# Patient Record
Sex: FEMALE | Race: WHITE | HISPANIC OR LATINO | Employment: FULL TIME | URBAN - METROPOLITAN AREA
[De-identification: names, ages, dates, MRNs, and addresses within clinical notes are randomized per-mention and may not be internally consistent; named-entity substitution may affect disease eponyms.]

---

## 2017-12-11 ENCOUNTER — OFFICE VISIT (OUTPATIENT)
Dept: OBSTETRICS AND GYNECOLOGY | Facility: CLINIC | Age: 27
End: 2017-12-11
Payer: COMMERCIAL

## 2017-12-11 VITALS
BODY MASS INDEX: 23.77 KG/M2 | HEIGHT: 61 IN | WEIGHT: 125.88 LBS | SYSTOLIC BLOOD PRESSURE: 118 MMHG | DIASTOLIC BLOOD PRESSURE: 76 MMHG

## 2017-12-11 DIAGNOSIS — Z30.015 ENCOUNTER FOR INITIAL PRESCRIPTION OF VAGINAL RING HORMONAL CONTRACEPTIVE: ICD-10-CM

## 2017-12-11 DIAGNOSIS — Z01.419 WELL WOMAN EXAM WITH ROUTINE GYNECOLOGICAL EXAM: Primary | ICD-10-CM

## 2017-12-11 PROCEDURE — 99999 PR PBB SHADOW E&M-EST. PATIENT-LVL III: CPT | Mod: PBBFAC,,, | Performed by: OBSTETRICS & GYNECOLOGY

## 2017-12-11 PROCEDURE — 99395 PREV VISIT EST AGE 18-39: CPT | Mod: S$GLB,,, | Performed by: OBSTETRICS & GYNECOLOGY

## 2017-12-11 RX ORDER — ETONOGESTREL AND ETHINYL ESTRADIOL VAGINAL RING .015; .12 MG/D; MG/D
1 RING VAGINAL
Qty: 1 EACH | Refills: 12 | Status: SHIPPED | OUTPATIENT
Start: 2017-12-11 | End: 2018-01-12

## 2017-12-11 NOTE — PROGRESS NOTES
Subjective:       Patient ID: Sheree Melgar is a 27 y.o. female.    Chief Complaint:  Annual Exam      History of Present Illness  HPI  Presents for well-woman exam.  No current gyn complaints.  Wants to discuss contraception.  Had weight gain on Depo Provera, and has trouble remembering to take a pill each day.  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  She is mutually monogomous with a male partner.     Last pap: 2016: normal  Had LSIL pap in  with normal colpo and negative ECC in   Pap in  negative  Pap in  negative    GYN & OB History  No LMP recorded. Patient is not currently having periods (Reason: Birth Control).   Date of Last Pap: 2016    OB History    Para Term  AB Living   2 1 1 0 1 1   SAB TAB Ectopic Multiple Live Births   1 0 0 0 1      # Outcome Date GA Lbr Jim/2nd Weight Sex Delivery Anes PTL Lv   2 SAB            1 Term      CS-LTranv   TUTU          Review of Systems  Review of Systems   Constitutional: Negative for activity change, fatigue, fever and unexpected weight change.   Gastrointestinal: Negative for abdominal pain, bloating, constipation, diarrhea, nausea and vomiting.   Endocrine: Negative for hair loss and hot flashes.   Genitourinary: Negative for dyspareunia, dysuria, frequency, genital sores, hematuria, menorrhagia, menstrual problem, pelvic pain, urgency, vaginal bleeding, vaginal discharge, vaginal pain, dysmenorrhea, postcoital bleeding and vaginal odor.   Skin:  Negative for rash and hair changes.   Hematological: Negative for adenopathy.   Breast: Negative for breast mass, breast pain, nipple discharge and skin changes          Objective:    Physical Exam:   Constitutional: She is oriented to person, place, and time. She appears well-developed and well-nourished. No distress.      Neck: Neck supple. No thyromegaly present.     Pulmonary/Chest: Right breast exhibits no inverted nipple, no mass, no  nipple discharge, no skin change, no tenderness, no bleeding and no swelling. Left breast exhibits no inverted nipple, no mass, no nipple discharge, no skin change, no tenderness, no bleeding and no swelling. Breasts are symmetrical.        Abdominal: Soft. She exhibits no distension and no mass. There is no tenderness. There is no rebound and no guarding.     Genitourinary: Pelvic exam was performed with patient supine. There is no rash, tenderness, lesion or injury on the right labia. There is no rash, tenderness, lesion or injury on the left labia. Uterus is not deviated, not enlarged, not fixed, not tender, not hosting fibroids and not experiencing uterine prolapse. Cervix is normal. Right adnexum displays no mass, no tenderness and no fullness. Left adnexum displays no mass, no tenderness and no fullness. No erythema, tenderness, bleeding, rectocele or cystocele in the vagina. No foreign body in the vagina. No signs of injury around the vagina. No vaginal discharge found. Cervix exhibits no motion tenderness, no discharge and no friability.        Uterus Size: 6 cm      Lymphadenopathy:        Right: No inguinal adenopathy present.        Left: No inguinal adenopathy present.    Neurological: She is alert and oriented to person, place, and time.     Psychiatric: She has a normal mood and affect.          Assessment:        1. Well woman exam with routine gynecological exam    2. Encounter for initial prescription of vaginal ring hormonal contraceptive                Plan:      Sheree was seen today for annual exam.    Diagnoses and all orders for this visit:    Well woman exam with routine gynecological exam    Encounter for initial prescription of vaginal ring hormonal contraceptive  -     etonogestrel-ethinyl estradiol (NUVARING) 0.12-0.015 mg/24 hr vaginal ring; Place 1 each vaginally every 21 days. Then remove x 7 days    Discussed contraception options with patient including pills, patch, ring, Depo  Provera, Implanon, Mirena, and Paraguard.  Pt interested in trying the NuvaRing.  Reviewed proper use.  All questions answered.  Reviewed updated recommendations for pap smears (every 3 years) in low risk patients.   Recommend annual pelvic exams.  Reviewed recommendations for annual CBE.  Next pap due in 2019.  RTC 1 year.

## 2018-01-12 ENCOUNTER — OFFICE VISIT (OUTPATIENT)
Dept: OBSTETRICS AND GYNECOLOGY | Facility: CLINIC | Age: 28
End: 2018-01-12
Payer: COMMERCIAL

## 2018-01-12 VITALS
WEIGHT: 126.31 LBS | DIASTOLIC BLOOD PRESSURE: 72 MMHG | BODY MASS INDEX: 23.85 KG/M2 | SYSTOLIC BLOOD PRESSURE: 110 MMHG | HEIGHT: 61 IN

## 2018-01-12 DIAGNOSIS — R11.0 NAUSEA: ICD-10-CM

## 2018-01-12 DIAGNOSIS — O34.219 HISTORY OF CESAREAN DELIVERY, CURRENTLY PREGNANT: ICD-10-CM

## 2018-01-12 DIAGNOSIS — Z32.01 PREGNANCY EXAMINATION OR TEST, POSITIVE RESULT: Primary | ICD-10-CM

## 2018-01-12 LAB
AMORPH CRY UR QL COMP ASSIST: ABNORMAL
BACTERIA #/AREA URNS AUTO: ABNORMAL /HPF
BILIRUB UR QL STRIP: NEGATIVE
CLARITY UR REFRACT.AUTO: ABNORMAL
COLOR UR AUTO: ABNORMAL
GLUCOSE UR QL STRIP: NEGATIVE
HGB UR QL STRIP: ABNORMAL
KETONES UR QL STRIP: NEGATIVE
LEUKOCYTE ESTERASE UR QL STRIP: NEGATIVE
MICROSCOPIC COMMENT: ABNORMAL
NITRITE UR QL STRIP: NEGATIVE
PH UR STRIP: 5 [PH] (ref 5–8)
PROT UR QL STRIP: NEGATIVE
RBC #/AREA URNS AUTO: 9 /HPF (ref 0–4)
SP GR UR STRIP: 1.02 (ref 1–1.03)
SQUAMOUS #/AREA URNS AUTO: 8 /HPF
URN SPEC COLLECT METH UR: ABNORMAL
UROBILINOGEN UR STRIP-ACNC: NEGATIVE EU/DL

## 2018-01-12 PROCEDURE — 87491 CHLMYD TRACH DNA AMP PROBE: CPT

## 2018-01-12 PROCEDURE — 87086 URINE CULTURE/COLONY COUNT: CPT

## 2018-01-12 PROCEDURE — 99999 PR PBB SHADOW E&M-EST. PATIENT-LVL III: CPT | Mod: PBBFAC,,, | Performed by: OBSTETRICS & GYNECOLOGY

## 2018-01-12 PROCEDURE — 99213 OFFICE O/P EST LOW 20 MIN: CPT | Mod: S$GLB,,, | Performed by: OBSTETRICS & GYNECOLOGY

## 2018-01-12 PROCEDURE — 81001 URINALYSIS AUTO W/SCOPE: CPT

## 2018-01-12 RX ORDER — DOXYLAMINE SUCCINATE AND PYRIDOXINE HYDROCHLORIDE, DELAYED RELEASE TABLETS 10 MG/10 MG 10; 10 MG/1; MG/1
2 TABLET, DELAYED RELEASE ORAL NIGHTLY
Qty: 30 TABLET | Refills: 6 | Status: SHIPPED | OUTPATIENT
Start: 2018-01-12 | End: 2018-02-16

## 2018-01-12 NOTE — PROGRESS NOTES
Subjective:       Patient ID: Sheree Melgar is a 27 y.o. female.    Chief Complaint:  Possible Pregnancy      History of Present Illness  HPI  Presents for pregnancy risk assessment.  Current pregnancy was unplanned, but pt is happy.  She has a hx of a term LTCS.  Op note in legacy documents in [facility].  Had primary LTCS for arrest of descent, fetal tachycardia, and maternal fever.  Pt is interested in a .  Also has hx of first trimester SAB.  Has daily nausea.  No vomiting yet.   Last pap: 2016 negative    GYN & OB History  Patient's last menstrual period was 2017.   Date of Last Pap: 2016    OB History    Para Term  AB Living   2 1 1 0 1 1   SAB TAB Ectopic Multiple Live Births   1 0 0 0 1      # Outcome Date GA Lbr Jim/2nd Weight Sex Delivery Anes PTL Lv   2 SAB            1 Term      CS-LTranv   TUTU          Review of Systems  Review of Systems   Constitutional: Negative for activity change, fatigue, fever and unexpected weight change.   Gastrointestinal: Positive for nausea. Negative for abdominal pain, bloating, constipation, diarrhea and vomiting.   Endocrine: Negative for hair loss and hot flashes.   Genitourinary: Negative for dyspareunia, dysuria, frequency, genital sores, hematuria, menorrhagia, menstrual problem (amenorrhea), pelvic pain, urgency, vaginal bleeding, vaginal discharge, vaginal pain, dysmenorrhea, postcoital bleeding and vaginal odor.   Skin:  Negative for rash and hair changes.   Hematological: Negative for adenopathy.   Breast: Positive for breast pain (BL breast soreness).Negative for breast mass, nipple discharge and skin changes          Objective:    Physical Exam:   Constitutional: She is oriented to person, place, and time. She appears well-developed and well-nourished. No distress.             Abdominal: Soft. She exhibits no distension and no mass. There is no tenderness. There is no rebound and no guarding. Hernia confirmed negative in  the right inguinal area and confirmed negative in the left inguinal area.     Genitourinary: Vagina normal. Pelvic exam was performed with patient supine. There is no rash, tenderness, lesion or injury on the right labia. There is no rash, tenderness, lesion or injury on the left labia. Uterus is not deviated, not enlarged, not fixed, not tender and not experiencing uterine prolapse. Right adnexum displays no mass, no tenderness and no fullness. Left adnexum displays no mass, no tenderness and no fullness. No erythema, tenderness, bleeding, rectocele, cystocele or unspecified prolapse of vaginal walls in the vagina. No foreign body in the vagina. No signs of injury around the vagina. No vaginal discharge found. Cervix exhibits no motion tenderness, no discharge and no friability.        Uterus Size: 7 cm       Neurological: She is alert and oriented to person, place, and time.     Psychiatric: She has a normal mood and affect.        UPT: positive  Assessment:        1. Pregnancy examination or test, positive result    2. Nausea    3. History of  delivery, currently pregnant                Plan:      Sheree was seen today for possible pregnancy.    Diagnoses and all orders for this visit:    Pregnancy examination or test, positive result  -     C. trachomatis/N. gonorrhoeae by AMP DNA Cervix  -     CBC auto differential; Future  -     Hepatitis B surface antigen; Future  -     HIV-1 and HIV-2 antibodies; Future  -     RPR; Future  -     Rubella antibody, IgG; Future  -     Type & Screen - Ob Profile; Future  -     Urinalysis  -     Urine culture  -     US OB/GYN Procedure ([facility]); Future    Nausea  -     doxylamine-pyridoxine, vit B6, 10-10 mg TbEC; Take 2 tablets by mouth every evening.    History of  delivery, currently pregnant    The patient was counseled on common complaints of pregnancy.  She was given bleeding and pain precautions.  I oriented the patient to the collaborative  CNM/physician practice, and all questions were answered.    RTC 2 weeks for initial OB visit with CNM, labs, and ultrasound.

## 2018-01-12 NOTE — PATIENT INSTRUCTIONS
Pregnancy    Your exam today shows that you are pregnant.  Pregnancy symptoms  During pregnancy your bodys hormones change. This causes physical and emotional changes. This is normal. Knowing what to expect is important for your piece of mind and so you know when to seek help for a problem. Here are some of the most common symptoms:  · Morning sickness or nausea. This can happen any time of the day or night.  · Tender, swollen breasts  · Need to urinate frequently  · Tiredness or fatigue  · Dizziness  · Indigestion or heartburn  · Food cravings or turn-offs  · Constipation  · Emotional changes. This can range from anxiety to excitement to depression.  General care for a healthy pregnancy  Here are things you can do to help make sure your baby is born healthy:  · Rest when you feel tired. This is especially true in the later months of pregnancy.  · Drink more fluids. Your body needs more fluids than you may be used to. Drink 8 to10 glasses of juice, milk, or water every day.  · Eat well-balanced meals. Eat at regular times to give your body enough protein. You can expect to gain about 30 pounds during the pregnancy. Dont try to diet or lose weight while you are pregnant.  · Take a prenatal vitamin every day. This helps you meet the extra nutritional needs of pregnancy.  · Dont take any other medicine during your pregnancy unless your healthcare provider tells you to. This includes prescription medicines and those you buy over the counter. Many medicines can harm the growing baby.  · If you have nausea or vomiting, dont eat greasy or fried foods. Eat several smaller meals throughout the day rather than 3 large meals.  · If you smoke, you must stop. The nicotine you breathe in goes right to the baby.  · Stay away from alcohol, even in moderate amounts. Daily drinking will harm your baby and can cause permanent brain damage.  · Dont use recreational drugs, especially cocaine, crack, and heroin. These will harm  your baby. Also avoid marijuana.  · If you were using recreational drugs or prescribed medicine when you found out that you were pregnant, talk with your healthcare provider about possible effects on your growing baby.  · If you have medical problems that you need to take medicine for, talk with your healthcare provider.  Follow-up care  Call your healthcare provider to arrange for prenatal care. Prenatal care is important. You can see your family provider, a pregnancy specialist (obstetrician), or a primary care clinic.  When to seek medical advice  Call your healthcare provider right away if any of these occur:  · Vaginal bleeding  · Pain in your belly (abdomen) or back that is moderate or severe  · Lots of vomiting, or you cant keep any fluids down for 6 hours  · Burning feeling when you urinate  · Headache, dizziness, or rapid weight gain  · Fever  · Vision changes or blurred vision  Date Last Reviewed: 10/1/2016  © 7985-7090 [employer]. [address] [place] [address], [place] [address]. All rights reserved. This information is not intended as a substitute for professional medical care. Always follow your healthcare professional's instructions.

## 2018-01-13 LAB — BACTERIA UR CULT: NO GROWTH

## 2018-01-15 LAB
C TRACH DNA SPEC QL NAA+PROBE: NOT DETECTED
N GONORRHOEA DNA SPEC QL NAA+PROBE: NOT DETECTED

## 2018-01-17 ENCOUNTER — TELEPHONE (OUTPATIENT)
Dept: OBSTETRICS AND GYNECOLOGY | Facility: CLINIC | Age: 28
End: 2018-01-17

## 2018-01-17 NOTE — TELEPHONE ENCOUNTER
----- Message from Toya Claros sent at 1/17/2018  1:46 PM CST -----  Contact: patient  States she can't make it Friday am but can come after lunch. Please call patient @ [phone]. Thanks, anival

## 2018-01-19 ENCOUNTER — PROCEDURE VISIT (OUTPATIENT)
Dept: OBSTETRICS AND GYNECOLOGY | Facility: CLINIC | Age: 28
End: 2018-01-19
Payer: COMMERCIAL

## 2018-01-19 ENCOUNTER — LAB VISIT (OUTPATIENT)
Dept: LAB | Facility: HOSPITAL | Age: 28
End: 2018-01-19
Attending: OBSTETRICS & GYNECOLOGY
Payer: COMMERCIAL

## 2018-01-19 ENCOUNTER — INITIAL PRENATAL (OUTPATIENT)
Dept: OBSTETRICS AND GYNECOLOGY | Facility: CLINIC | Age: 28
End: 2018-01-19
Payer: COMMERCIAL

## 2018-01-19 VITALS — WEIGHT: 130.5 LBS | DIASTOLIC BLOOD PRESSURE: 72 MMHG | BODY MASS INDEX: 24.66 KG/M2 | SYSTOLIC BLOOD PRESSURE: 112 MMHG

## 2018-01-19 DIAGNOSIS — Z34.81 SUPERVISION OF NORMAL INTRAUTERINE PREGNANCY IN MULTIGRAVIDA IN FIRST TRIMESTER: Primary | ICD-10-CM

## 2018-01-19 DIAGNOSIS — N91.2 AMENORRHEA, UNSPECIFIED: ICD-10-CM

## 2018-01-19 DIAGNOSIS — Z32.01 PREGNANCY EXAMINATION OR TEST, POSITIVE RESULT: ICD-10-CM

## 2018-01-19 DIAGNOSIS — Z3A.01 7 WEEKS GESTATION OF PREGNANCY: ICD-10-CM

## 2018-01-19 LAB
BASOPHILS # BLD AUTO: 0.02 K/UL
BASOPHILS NFR BLD: 0.3 %
DIFFERENTIAL METHOD: ABNORMAL
EOSINOPHIL # BLD AUTO: 0 K/UL
EOSINOPHIL NFR BLD: 0.4 %
ERYTHROCYTE [DISTWIDTH] IN BLOOD BY AUTOMATED COUNT: 11.6 %
HCT VFR BLD AUTO: 39.3 %
HGB BLD-MCNC: 13.3 G/DL
IMM GRANULOCYTES # BLD AUTO: 0.02 K/UL
IMM GRANULOCYTES NFR BLD AUTO: 0.3 %
LYMPHOCYTES # BLD AUTO: 1.4 K/UL
LYMPHOCYTES NFR BLD: 20.3 %
MCH RBC QN AUTO: 30.8 PG
MCHC RBC AUTO-ENTMCNC: 33.8 G/DL
MCV RBC AUTO: 91 FL
MONOCYTES # BLD AUTO: 0.3 K/UL
MONOCYTES NFR BLD: 4.9 %
NEUTROPHILS # BLD AUTO: 5.1 K/UL
NEUTROPHILS NFR BLD: 73.8 %
NRBC BLD-RTO: 0 /100 WBC
PLATELET # BLD AUTO: 268 K/UL
PMV BLD AUTO: 11.1 FL
RBC # BLD AUTO: 4.32 M/UL
WBC # BLD AUTO: 6.89 K/UL

## 2018-01-19 PROCEDURE — 76801 OB US < 14 WKS SINGLE FETUS: CPT | Mod: S$GLB,,, | Performed by: OBSTETRICS & GYNECOLOGY

## 2018-01-19 PROCEDURE — 86592 SYPHILIS TEST NON-TREP QUAL: CPT

## 2018-01-19 PROCEDURE — 85025 COMPLETE CBC W/AUTO DIFF WBC: CPT

## 2018-01-19 PROCEDURE — 99999 PR PBB SHADOW E&M-EST. PATIENT-LVL II: CPT | Mod: PBBFAC,,, | Performed by: ADVANCED PRACTICE MIDWIFE

## 2018-01-19 PROCEDURE — 86901 BLOOD TYPING SEROLOGIC RH(D): CPT

## 2018-01-19 PROCEDURE — 87340 HEPATITIS B SURFACE AG IA: CPT

## 2018-01-19 PROCEDURE — 86762 RUBELLA ANTIBODY: CPT

## 2018-01-19 PROCEDURE — 0500F INITIAL PRENATAL CARE VISIT: CPT | Mod: S$GLB,,, | Performed by: ADVANCED PRACTICE MIDWIFE

## 2018-01-19 PROCEDURE — 86703 HIV-1/HIV-2 1 RESULT ANTBDY: CPT

## 2018-01-19 PROCEDURE — 36415 COLL VENOUS BLD VENIPUNCTURE: CPT

## 2018-01-19 NOTE — PROGRESS NOTES
Pt here for new ob visit  Oriented to the practice including CNM/MD collaboration  Reviewed cultures. Will do NOB labs today.   Introduced to Coffective alhaji  Blue bag materials reviewed  Warning signs discussed.  US reviewed. Single intrauterine gestation measuring 7 weeks.

## 2018-01-20 LAB
ABO + RH BLD: NORMAL
BLD GP AB SCN CELLS X3 SERPL QL: NORMAL
RPR SER QL: NORMAL

## 2018-01-22 LAB
HBV SURFACE AG SERPL QL IA: NEGATIVE
HIV 1+2 AB+HIV1 P24 AG SERPL QL IA: NEGATIVE
RUBV IGG SER-ACNC: 7.7 IU/ML
RUBV IGG SER-IMP: ABNORMAL

## 2018-02-16 ENCOUNTER — ROUTINE PRENATAL (OUTPATIENT)
Dept: OBSTETRICS AND GYNECOLOGY | Facility: CLINIC | Age: 28
End: 2018-02-16
Payer: COMMERCIAL

## 2018-02-16 VITALS
SYSTOLIC BLOOD PRESSURE: 118 MMHG | BODY MASS INDEX: 24.24 KG/M2 | DIASTOLIC BLOOD PRESSURE: 74 MMHG | WEIGHT: 128.31 LBS

## 2018-02-16 DIAGNOSIS — Z3A.11 11 WEEKS GESTATION OF PREGNANCY: ICD-10-CM

## 2018-02-16 DIAGNOSIS — O34.219 HISTORY OF CESAREAN DELIVERY, CURRENTLY PREGNANT: Primary | ICD-10-CM

## 2018-02-16 DIAGNOSIS — O21.9 NAUSEA AND VOMITING IN PREGNANCY PRIOR TO 22 WEEKS GESTATION: ICD-10-CM

## 2018-02-16 PROCEDURE — 99999 PR PBB SHADOW E&M-EST. PATIENT-LVL II: CPT | Mod: PBBFAC,,, | Performed by: MIDWIFE

## 2018-02-16 PROCEDURE — 0502F SUBSEQUENT PRENATAL CARE: CPT | Mod: S$GLB,,, | Performed by: MIDWIFE

## 2018-02-16 RX ORDER — ONDANSETRON HYDROCHLORIDE 8 MG/1
8 TABLET, FILM COATED ORAL EVERY 12 HOURS PRN
Qty: 20 TABLET | Refills: 0 | Status: SHIPPED | OUTPATIENT
Start: 2018-02-16 | End: 2018-06-28

## 2018-02-16 RX ORDER — PROMETHAZINE HYDROCHLORIDE 25 MG/1
25 TABLET ORAL EVERY 6 HOURS PRN
Qty: 30 TABLET | Refills: 1 | Status: SHIPPED | OUTPATIENT
Start: 2018-02-16 | End: 2018-06-28

## 2018-03-27 ENCOUNTER — ROUTINE PRENATAL (OUTPATIENT)
Dept: OBSTETRICS AND GYNECOLOGY | Facility: CLINIC | Age: 28
End: 2018-03-27
Payer: COMMERCIAL

## 2018-03-27 VITALS
SYSTOLIC BLOOD PRESSURE: 100 MMHG | DIASTOLIC BLOOD PRESSURE: 58 MMHG | WEIGHT: 131.81 LBS | BODY MASS INDEX: 24.91 KG/M2

## 2018-03-27 DIAGNOSIS — Z36.89 ENCOUNTER FOR FETAL ANATOMIC SURVEY: ICD-10-CM

## 2018-03-27 DIAGNOSIS — O34.219 HISTORY OF CESAREAN DELIVERY, CURRENTLY PREGNANT: Primary | ICD-10-CM

## 2018-03-27 PROCEDURE — 99999 PR PBB SHADOW E&M-EST. PATIENT-LVL II: CPT | Mod: PBBFAC,,, | Performed by: ADVANCED PRACTICE MIDWIFE

## 2018-03-27 PROCEDURE — 0502F SUBSEQUENT PRENATAL CARE: CPT | Mod: S$GLB,,, | Performed by: ADVANCED PRACTICE MIDWIFE

## 2018-03-27 NOTE — PROGRESS NOTES
Doing well, still having N/V but able to eat frequent small meals and drink water.  Declines genetic testing  Anatomy screening at [facility]  Encouraged hydration  Coffective counseling sheet Fall In Love discussed with mother. Reinforced immediate skin to skin, the magic first hour, importance of the first feeding and delaying routine procedures. Encouraged mother to download Coffective mobile alhaji if she has not already done so. Mother verbalizes understanding.    STEPHANIE Tovar

## 2018-04-16 ENCOUNTER — PROCEDURE VISIT (OUTPATIENT)
Dept: OBSTETRICS AND GYNECOLOGY | Facility: CLINIC | Age: 28
End: 2018-04-16
Payer: COMMERCIAL

## 2018-04-16 ENCOUNTER — ROUTINE PRENATAL (OUTPATIENT)
Dept: OBSTETRICS AND GYNECOLOGY | Facility: CLINIC | Age: 28
End: 2018-04-16
Payer: COMMERCIAL

## 2018-04-16 VITALS
DIASTOLIC BLOOD PRESSURE: 60 MMHG | WEIGHT: 136.25 LBS | SYSTOLIC BLOOD PRESSURE: 102 MMHG | BODY MASS INDEX: 25.74 KG/M2

## 2018-04-16 DIAGNOSIS — Z36.89 ENCOUNTER FOR FETAL ANATOMIC SURVEY: ICD-10-CM

## 2018-04-16 DIAGNOSIS — O34.219 HISTORY OF CESAREAN DELIVERY, CURRENTLY PREGNANT: Primary | ICD-10-CM

## 2018-04-16 PROCEDURE — 0502F SUBSEQUENT PRENATAL CARE: CPT | Mod: S$GLB,,, | Performed by: ADVANCED PRACTICE MIDWIFE

## 2018-04-16 PROCEDURE — 76805 OB US >/= 14 WKS SNGL FETUS: CPT | Mod: S$GLB,,, | Performed by: OBSTETRICS & GYNECOLOGY

## 2018-04-16 PROCEDURE — 99999 PR PBB SHADOW E&M-EST. PATIENT-LVL II: CPT | Mod: PBBFAC,,, | Performed by: ADVANCED PRACTICE MIDWIFE

## 2018-04-16 NOTE — PROGRESS NOTES
Doing well, no complaints  Anatomy US today, breech presentation, posterior placenta, DANIELLA wnl, EFW 0lb 11oz, anatomy complete, reviewed with pt, Gender reveal on 5/5/18  Encouraged hydration  Desires NCB, speaking with  (padma Grover)  Coffective counseling sheet Fall In Love discussed with mother. Reinforced immediate skin to skin, the magic first hour, importance of the first feeding and delaying routine procedures. Encouraged mother to download Coffective mobile alhaji if she has not already done so. Mother verbalizes understanding.    STEPHANIE Tovar

## 2018-05-21 ENCOUNTER — ROUTINE PRENATAL (OUTPATIENT)
Dept: OBSTETRICS AND GYNECOLOGY | Facility: CLINIC | Age: 28
End: 2018-05-21
Payer: COMMERCIAL

## 2018-05-21 VITALS
DIASTOLIC BLOOD PRESSURE: 60 MMHG | WEIGHT: 143.75 LBS | BODY MASS INDEX: 27.16 KG/M2 | SYSTOLIC BLOOD PRESSURE: 110 MMHG

## 2018-05-21 DIAGNOSIS — O34.219 HISTORY OF CESAREAN DELIVERY, CURRENTLY PREGNANT: Primary | ICD-10-CM

## 2018-05-21 PROCEDURE — 0502F SUBSEQUENT PRENATAL CARE: CPT | Mod: S$GLB,,, | Performed by: ADVANCED PRACTICE MIDWIFE

## 2018-05-21 PROCEDURE — 99999 PR PBB SHADOW E&M-EST. PATIENT-LVL II: CPT | Mod: PBBFAC,,, | Performed by: ADVANCED PRACTICE MIDWIFE

## 2018-05-21 NOTE — PROGRESS NOTES
Doing well   PTL precautions reviewed, when to go to hospital  Stressed hydration and using bug spray   28wk labs nv, RH +  Discussed tdap, will offer nv   Coffective counseling sheet Nourish discussed with mother. Reinforced basic breastfeeding position and latch as well as proper hand expression technique. Encouraged mother to download Coffective mobile alhaji if she has not already done so.  Mother verbalizes understanding.

## 2018-06-18 ENCOUNTER — TELEPHONE (OUTPATIENT)
Dept: OBSTETRICS AND GYNECOLOGY | Facility: CLINIC | Age: 28
End: 2018-06-18

## 2018-06-18 NOTE — TELEPHONE ENCOUNTER
Pt has already rescheduled her appts for next Thursday, 6/28/18 at 9:30am at [facility] location.  Pt voiced understanding.  VB, LPN

## 2018-06-18 NOTE — TELEPHONE ENCOUNTER
----- Message from Kunal Talbot sent at 6/18/2018 10:55 AM CDT -----  Contact: julia [phone]  Patient wants to know if she can change her appt from tomorrow until Thursday at the same time.

## 2018-06-28 ENCOUNTER — LAB VISIT (OUTPATIENT)
Dept: LAB | Facility: HOSPITAL | Age: 28
End: 2018-06-28
Payer: COMMERCIAL

## 2018-06-28 ENCOUNTER — ROUTINE PRENATAL (OUTPATIENT)
Dept: OBSTETRICS AND GYNECOLOGY | Facility: CLINIC | Age: 28
End: 2018-06-28
Payer: COMMERCIAL

## 2018-06-28 VITALS
WEIGHT: 151.69 LBS | BODY MASS INDEX: 28.66 KG/M2 | DIASTOLIC BLOOD PRESSURE: 74 MMHG | SYSTOLIC BLOOD PRESSURE: 118 MMHG

## 2018-06-28 DIAGNOSIS — O34.219 HISTORY OF CESAREAN DELIVERY, CURRENTLY PREGNANT: ICD-10-CM

## 2018-06-28 DIAGNOSIS — O34.219 HISTORY OF CESAREAN DELIVERY, CURRENTLY PREGNANT: Primary | ICD-10-CM

## 2018-06-28 LAB
BASOPHILS # BLD AUTO: 0.01 K/UL
BASOPHILS NFR BLD: 0.2 %
DIFFERENTIAL METHOD: ABNORMAL
EOSINOPHIL # BLD AUTO: 0 K/UL
EOSINOPHIL NFR BLD: 0.5 %
ERYTHROCYTE [DISTWIDTH] IN BLOOD BY AUTOMATED COUNT: 12.7 %
GLUCOSE SERPL-MCNC: 132 MG/DL
HCT VFR BLD AUTO: 32.1 %
HGB BLD-MCNC: 10.2 G/DL
IMM GRANULOCYTES # BLD AUTO: 0.03 K/UL
IMM GRANULOCYTES NFR BLD AUTO: 0.5 %
LYMPHOCYTES # BLD AUTO: 0.6 K/UL
LYMPHOCYTES NFR BLD: 10.9 %
MCH RBC QN AUTO: 30.1 PG
MCHC RBC AUTO-ENTMCNC: 31.8 G/DL
MCV RBC AUTO: 95 FL
MONOCYTES # BLD AUTO: 0.4 K/UL
MONOCYTES NFR BLD: 7.3 %
NEUTROPHILS # BLD AUTO: 4.7 K/UL
NEUTROPHILS NFR BLD: 80.6 %
NRBC BLD-RTO: 0 /100 WBC
PLATELET # BLD AUTO: 233 K/UL
PMV BLD AUTO: 11.2 FL
RBC # BLD AUTO: 3.39 M/UL
WBC # BLD AUTO: 5.88 K/UL

## 2018-06-28 PROCEDURE — 86703 HIV-1/HIV-2 1 RESULT ANTBDY: CPT

## 2018-06-28 PROCEDURE — 82950 GLUCOSE TEST: CPT

## 2018-06-28 PROCEDURE — 99999 PR PBB SHADOW E&M-EST. PATIENT-LVL II: CPT | Mod: PBBFAC,,, | Performed by: ADVANCED PRACTICE MIDWIFE

## 2018-06-28 PROCEDURE — 86592 SYPHILIS TEST NON-TREP QUAL: CPT

## 2018-06-28 PROCEDURE — 36415 COLL VENOUS BLD VENIPUNCTURE: CPT

## 2018-06-28 PROCEDURE — 0502F SUBSEQUENT PRENATAL CARE: CPT | Mod: S$GLB,,, | Performed by: ADVANCED PRACTICE MIDWIFE

## 2018-06-28 PROCEDURE — 85025 COMPLETE CBC W/AUTO DIFF WBC: CPT

## 2018-06-29 LAB
HIV 1+2 AB+HIV1 P24 AG SERPL QL IA: NEGATIVE
RPR SER QL: NORMAL

## 2018-06-29 NOTE — PROGRESS NOTES
Doing well   PTL precautions reviewed, when to go to hospital   Stressed hydration   Doing labs today, RH +  Coffective counseling sheet Protect Breastfeeding discussed with mother. Reinforced avoidence of artifical nipples and formula, unless medically indicated.  Encouraged mother to download Coffective mobile lahaji if she has not already done so. Mother verbalizes understanding.

## 2018-07-11 ENCOUNTER — ROUTINE PRENATAL (OUTPATIENT)
Dept: OBSTETRICS AND GYNECOLOGY | Facility: CLINIC | Age: 28
End: 2018-07-11
Payer: COMMERCIAL

## 2018-07-11 VITALS
DIASTOLIC BLOOD PRESSURE: 60 MMHG | WEIGHT: 156.94 LBS | BODY MASS INDEX: 29.66 KG/M2 | SYSTOLIC BLOOD PRESSURE: 112 MMHG

## 2018-07-11 DIAGNOSIS — O34.219 HISTORY OF CESAREAN DELIVERY, CURRENTLY PREGNANT: Primary | ICD-10-CM

## 2018-07-11 PROCEDURE — 99999 PR PBB SHADOW E&M-EST. PATIENT-LVL II: CPT | Mod: PBBFAC,,, | Performed by: ADVANCED PRACTICE MIDWIFE

## 2018-07-11 PROCEDURE — 0502F SUBSEQUENT PRENATAL CARE: CPT | Mod: S$GLB,,, | Performed by: ADVANCED PRACTICE MIDWIFE

## 2018-07-11 NOTE — PROGRESS NOTES
"Doing well, some vaginal pressure/pain, comes and goes   PTL precautions reviewed, when to go to hospital   28wk labs reviewed   "Quiet Hours"  "

## 2018-07-31 ENCOUNTER — ROUTINE PRENATAL (OUTPATIENT)
Dept: OBSTETRICS AND GYNECOLOGY | Facility: CLINIC | Age: 28
End: 2018-07-31
Payer: COMMERCIAL

## 2018-07-31 VITALS
WEIGHT: 158.06 LBS | DIASTOLIC BLOOD PRESSURE: 68 MMHG | SYSTOLIC BLOOD PRESSURE: 110 MMHG | BODY MASS INDEX: 29.87 KG/M2

## 2018-07-31 DIAGNOSIS — O34.219 HISTORY OF CESAREAN DELIVERY, CURRENTLY PREGNANT: Primary | ICD-10-CM

## 2018-07-31 PROCEDURE — 99999 PR PBB SHADOW E&M-EST. PATIENT-LVL II: CPT | Mod: PBBFAC,,, | Performed by: ADVANCED PRACTICE MIDWIFE

## 2018-07-31 PROCEDURE — 0502F SUBSEQUENT PRENATAL CARE: CPT | Mod: S$GLB,,, | Performed by: ADVANCED PRACTICE MIDWIFE

## 2018-07-31 NOTE — PROGRESS NOTES
Doing well  GBS nv  PTL precautions, FKCs and when to go to hospital  Discussed NCB and birth plan    STEPHANIE Tovar

## 2018-08-09 ENCOUNTER — PATIENT MESSAGE (OUTPATIENT)
Dept: OBSTETRICS AND GYNECOLOGY | Facility: CLINIC | Age: 28
End: 2018-08-09

## 2018-08-14 ENCOUNTER — ROUTINE PRENATAL (OUTPATIENT)
Dept: OBSTETRICS AND GYNECOLOGY | Facility: CLINIC | Age: 28
End: 2018-08-14
Payer: COMMERCIAL

## 2018-08-14 VITALS
DIASTOLIC BLOOD PRESSURE: 74 MMHG | BODY MASS INDEX: 30.49 KG/M2 | WEIGHT: 161.38 LBS | SYSTOLIC BLOOD PRESSURE: 116 MMHG

## 2018-08-14 DIAGNOSIS — O34.219 HISTORY OF CESAREAN DELIVERY, CURRENTLY PREGNANT: Primary | ICD-10-CM

## 2018-08-14 DIAGNOSIS — Z3A.36 36 WEEKS GESTATION OF PREGNANCY: ICD-10-CM

## 2018-08-14 PROCEDURE — 0502F SUBSEQUENT PRENATAL CARE: CPT | Mod: S$GLB,,, | Performed by: MIDWIFE

## 2018-08-14 PROCEDURE — 87081 CULTURE SCREEN ONLY: CPT

## 2018-08-14 PROCEDURE — 99999 PR PBB SHADOW E&M-EST. PATIENT-LVL II: CPT | Mod: PBBFAC,,, | Performed by: MIDWIFE

## 2018-08-17 LAB — BACTERIA SPEC AEROBE CULT: NORMAL

## 2018-08-21 ENCOUNTER — ROUTINE PRENATAL (OUTPATIENT)
Dept: OBSTETRICS AND GYNECOLOGY | Facility: CLINIC | Age: 28
End: 2018-08-21
Payer: COMMERCIAL

## 2018-08-21 VITALS
DIASTOLIC BLOOD PRESSURE: 70 MMHG | WEIGHT: 164.69 LBS | BODY MASS INDEX: 31.12 KG/M2 | SYSTOLIC BLOOD PRESSURE: 110 MMHG

## 2018-08-21 DIAGNOSIS — O34.219 HISTORY OF CESAREAN DELIVERY, CURRENTLY PREGNANT: Primary | ICD-10-CM

## 2018-08-21 DIAGNOSIS — O26.843 SIGNIFICANT DISCREPANCY BETWEEN UTERINE SIZE AND CLINICAL DATES, ANTEPARTUM, THIRD TRIMESTER: ICD-10-CM

## 2018-08-21 PROCEDURE — 99999 PR PBB SHADOW E&M-EST. PATIENT-LVL II: CPT | Mod: PBBFAC,,, | Performed by: ADVANCED PRACTICE MIDWIFE

## 2018-08-21 PROCEDURE — 0502F SUBSEQUENT PRENATAL CARE: CPT | Mod: S$GLB,,, | Performed by: ADVANCED PRACTICE MIDWIFE

## 2018-08-21 NOTE — PROGRESS NOTES
Doing well   Leopold's reveals moderate amount of fluid, will perform ultrasound at nv to assess   VE per pt request  Labor precautions and fetal movement reviewed, when to go to hospital   The skin of the suprapubic region was evaluated and appears clean, dry and intact.  Counseled the patient to shower daily and to wash this area with an antibacterial soap such as Dial daily.  Advised her to not shave the hair from this area from now until after delivery.  I also counseled the patient to place antibacterial hand soap in all her bathrooms and kitchen at home to help facilitate proper hand hygiene practices before and after delivery.

## 2018-08-24 ENCOUNTER — PATIENT MESSAGE (OUTPATIENT)
Dept: OBSTETRICS AND GYNECOLOGY | Facility: CLINIC | Age: 28
End: 2018-08-24

## 2018-08-24 ENCOUNTER — TELEPHONE (OUTPATIENT)
Dept: OBSTETRICS AND GYNECOLOGY | Facility: CLINIC | Age: 28
End: 2018-08-24

## 2018-08-24 NOTE — TELEPHONE ENCOUNTER
FMLA completed, scanned to chart and faxed to Haley at [phone].  Patient notified and original at  at [place].

## 2018-08-29 ENCOUNTER — ROUTINE PRENATAL (OUTPATIENT)
Dept: OBSTETRICS AND GYNECOLOGY | Facility: CLINIC | Age: 28
End: 2018-08-29
Payer: COMMERCIAL

## 2018-08-29 ENCOUNTER — PROCEDURE VISIT (OUTPATIENT)
Dept: OBSTETRICS AND GYNECOLOGY | Facility: CLINIC | Age: 28
End: 2018-08-29
Payer: COMMERCIAL

## 2018-08-29 VITALS — BODY MASS INDEX: 31.2 KG/M2 | DIASTOLIC BLOOD PRESSURE: 60 MMHG | SYSTOLIC BLOOD PRESSURE: 118 MMHG | WEIGHT: 165.13 LBS

## 2018-08-29 DIAGNOSIS — O34.219 HISTORY OF CESAREAN DELIVERY, CURRENTLY PREGNANT: Primary | ICD-10-CM

## 2018-08-29 DIAGNOSIS — O26.843 SIGNIFICANT DISCREPANCY BETWEEN UTERINE SIZE AND CLINICAL DATES, ANTEPARTUM, THIRD TRIMESTER: ICD-10-CM

## 2018-08-29 DIAGNOSIS — O40.3XX0 POLYHYDRAMNIOS AFFECTING PREGNANCY IN THIRD TRIMESTER: ICD-10-CM

## 2018-08-29 PROCEDURE — 0502F SUBSEQUENT PRENATAL CARE: CPT | Mod: S$GLB,,, | Performed by: ADVANCED PRACTICE MIDWIFE

## 2018-08-29 PROCEDURE — 76816 OB US FOLLOW-UP PER FETUS: CPT | Mod: S$GLB,,, | Performed by: OBSTETRICS & GYNECOLOGY

## 2018-08-29 PROCEDURE — 76819 FETAL BIOPHYS PROFIL W/O NST: CPT | Mod: S$GLB,,, | Performed by: OBSTETRICS & GYNECOLOGY

## 2018-08-29 PROCEDURE — 99999 PR PBB SHADOW E&M-EST. PATIENT-LVL II: CPT | Mod: PBBFAC,,, | Performed by: ADVANCED PRACTICE MIDWIFE

## 2018-08-29 NOTE — PROGRESS NOTES
Doing well, ready for baby  US today for size>dates, growth, and BPP, posterior placenta, cephalic presentation, DANIELLA 26.8cm, MVP 8.2cm, EFW 8lb 1oz 72%, reviewed with pt and .  VE per pt request  Hydration encouraged  Labor precautions discussed and when to go to hospital   Will discuss IOL at nv    STEPHANIE Tovar

## 2018-09-04 ENCOUNTER — PATIENT MESSAGE (OUTPATIENT)
Dept: OBSTETRICS AND GYNECOLOGY | Facility: CLINIC | Age: 28
End: 2018-09-04

## 2018-09-05 ENCOUNTER — PATIENT MESSAGE (OUTPATIENT)
Dept: OBSTETRICS AND GYNECOLOGY | Facility: CLINIC | Age: 28
End: 2018-09-05

## 2018-09-07 ENCOUNTER — ROUTINE PRENATAL (OUTPATIENT)
Dept: OBSTETRICS AND GYNECOLOGY | Facility: CLINIC | Age: 28
End: 2018-09-07
Payer: COMMERCIAL

## 2018-09-07 ENCOUNTER — PROCEDURE VISIT (OUTPATIENT)
Dept: OBSTETRICS AND GYNECOLOGY | Facility: CLINIC | Age: 28
End: 2018-09-07
Payer: COMMERCIAL

## 2018-09-07 VITALS
DIASTOLIC BLOOD PRESSURE: 70 MMHG | WEIGHT: 167.13 LBS | BODY MASS INDEX: 31.57 KG/M2 | SYSTOLIC BLOOD PRESSURE: 128 MMHG

## 2018-09-07 DIAGNOSIS — O34.219 HISTORY OF CESAREAN DELIVERY, CURRENTLY PREGNANT: ICD-10-CM

## 2018-09-07 DIAGNOSIS — O48.0 POST-TERM PREGNANCY, 40-42 WEEKS OF GESTATION: ICD-10-CM

## 2018-09-07 DIAGNOSIS — O40.3XX0 POLYHYDRAMNIOS AFFECTING PREGNANCY IN THIRD TRIMESTER: ICD-10-CM

## 2018-09-07 DIAGNOSIS — O48.0 POST-TERM PREGNANCY, 40-42 WEEKS OF GESTATION: Primary | ICD-10-CM

## 2018-09-07 PROCEDURE — 99999 PR PBB SHADOW E&M-EST. PATIENT-LVL II: CPT | Mod: PBBFAC,,, | Performed by: ADVANCED PRACTICE MIDWIFE

## 2018-09-07 PROCEDURE — 76819 FETAL BIOPHYS PROFIL W/O NST: CPT | Mod: S$GLB,,, | Performed by: OBSTETRICS & GYNECOLOGY

## 2018-09-07 PROCEDURE — 0502F SUBSEQUENT PRENATAL CARE: CPT | Mod: S$GLB,,, | Performed by: ADVANCED PRACTICE MIDWIFE

## 2018-09-07 NOTE — PROGRESS NOTES
Reports good FM.  BPP 8 of 8 with DANIELLA 18.4, VTX.    FKC's and S/S labor reviewed.  Attempted SVE unsuccessful patient could not tolerate.  Patient declines IOL states wants to wait another week.  Risks and benefits of  and continued pregnancy discussed.  Advised to LND for < 10 mvts in a 2 he period.  BPP with RTC 1 week if undelivered.

## 2018-09-14 ENCOUNTER — PROCEDURE VISIT (OUTPATIENT)
Dept: OBSTETRICS AND GYNECOLOGY | Facility: CLINIC | Age: 28
End: 2018-09-14
Payer: COMMERCIAL

## 2018-09-14 ENCOUNTER — PATIENT MESSAGE (OUTPATIENT)
Dept: OBSTETRICS AND GYNECOLOGY | Facility: CLINIC | Age: 28
End: 2018-09-14

## 2018-09-14 ENCOUNTER — ROUTINE PRENATAL (OUTPATIENT)
Dept: OBSTETRICS AND GYNECOLOGY | Facility: CLINIC | Age: 28
End: 2018-09-14
Payer: COMMERCIAL

## 2018-09-14 VITALS — DIASTOLIC BLOOD PRESSURE: 68 MMHG | WEIGHT: 168 LBS | BODY MASS INDEX: 31.74 KG/M2 | SYSTOLIC BLOOD PRESSURE: 112 MMHG

## 2018-09-14 DIAGNOSIS — O40.3XX0 POLYHYDRAMNIOS AFFECTING PREGNANCY IN THIRD TRIMESTER: Primary | ICD-10-CM

## 2018-09-14 DIAGNOSIS — O48.0 POST TERM PREGNANCY OVER 40 WEEKS: ICD-10-CM

## 2018-09-14 DIAGNOSIS — Z36.4 SCREENING, ANTENATAL FOR FETAL GROWTH RETARDATION WITH ULTRASOUND: ICD-10-CM

## 2018-09-14 DIAGNOSIS — O48.0 POST-TERM PREGNANCY, 40-42 WEEKS OF GESTATION: ICD-10-CM

## 2018-09-14 PROCEDURE — 99999 PR PBB SHADOW E&M-EST. PATIENT-LVL III: CPT | Mod: PBBFAC,,, | Performed by: ADVANCED PRACTICE MIDWIFE

## 2018-09-14 PROCEDURE — 0502F SUBSEQUENT PRENATAL CARE: CPT | Mod: S$GLB,,, | Performed by: ADVANCED PRACTICE MIDWIFE

## 2018-09-14 PROCEDURE — 76819 FETAL BIOPHYS PROFIL W/O NST: CPT | Mod: S$GLB,,, | Performed by: OBSTETRICS & GYNECOLOGY

## 2018-09-17 ENCOUNTER — HOSPITAL ENCOUNTER (INPATIENT)
Facility: HOSPITAL | Age: 28
LOS: 2 days | Discharge: HOME | End: 2018-09-19
Attending: OBSTETRICS & GYNECOLOGY | Admitting: OBSTETRICS & GYNECOLOGY
Payer: COMMERCIAL

## 2018-09-17 ENCOUNTER — ANESTHESIA (OUTPATIENT)
Dept: OBSTETRICS AND GYNECOLOGY | Facility: HOSPITAL | Age: 28
End: 2018-09-17
Payer: COMMERCIAL

## 2018-09-17 ENCOUNTER — ANESTHESIA EVENT (OUTPATIENT)
Dept: OBSTETRICS AND GYNECOLOGY | Facility: HOSPITAL | Age: 28
End: 2018-09-17
Payer: COMMERCIAL

## 2018-09-17 DIAGNOSIS — O42.90 AMNIOTIC FLUID LEAKING: ICD-10-CM

## 2018-09-17 DIAGNOSIS — Z98.891 S/P CESAREAN SECTION: ICD-10-CM

## 2018-09-17 LAB
ABO + RH BLD: NORMAL
BASOPHILS # BLD AUTO: 0.01 K/UL
BASOPHILS NFR BLD: 0.1 %
BLD GP AB SCN CELLS X3 SERPL QL: NORMAL
DIFFERENTIAL METHOD: ABNORMAL
EOSINOPHIL # BLD AUTO: 0.1 K/UL
EOSINOPHIL NFR BLD: 0.7 %
ERYTHROCYTE [DISTWIDTH] IN BLOOD BY AUTOMATED COUNT: 14.8 %
HCT VFR BLD AUTO: 31.4 %
HGB BLD-MCNC: 9.9 G/DL
LYMPHOCYTES # BLD AUTO: 1.6 K/UL
LYMPHOCYTES NFR BLD: 19 %
MCH RBC QN AUTO: 26.3 PG
MCHC RBC AUTO-ENTMCNC: 31.5 G/DL
MCV RBC AUTO: 83 FL
MONOCYTES # BLD AUTO: 0.5 K/UL
MONOCYTES NFR BLD: 6.1 %
NEUTROPHILS # BLD AUTO: 6.3 K/UL
NEUTROPHILS NFR BLD: 74.1 %
PLATELET # BLD AUTO: 247 K/UL
PMV BLD AUTO: 10.6 FL
RBC # BLD AUTO: 3.77 M/UL
WBC # BLD AUTO: 8.49 K/UL

## 2018-09-17 PROCEDURE — 86850 RBC ANTIBODY SCREEN: CPT

## 2018-09-17 PROCEDURE — 51702 INSERT TEMP BLADDER CATH: CPT

## 2018-09-17 PROCEDURE — 72100002 HC LABOR CARE, 1ST 8 HOURS

## 2018-09-17 PROCEDURE — 25000003 PHARM REV CODE 250: Performed by: OBSTETRICS & GYNECOLOGY

## 2018-09-17 PROCEDURE — 27800517 HC TRAY,EPIDURAL-CONTINUOUS: Performed by: NURSE ANESTHETIST, CERTIFIED REGISTERED

## 2018-09-17 PROCEDURE — 11000001 HC ACUTE MED/SURG PRIVATE ROOM

## 2018-09-17 PROCEDURE — 25000003 PHARM REV CODE 250: Performed by: NURSE ANESTHETIST, CERTIFIED REGISTERED

## 2018-09-17 PROCEDURE — 63600175 PHARM REV CODE 636 W HCPCS: Performed by: OBSTETRICS & GYNECOLOGY

## 2018-09-17 PROCEDURE — 63600175 PHARM REV CODE 636 W HCPCS: Performed by: NURSE ANESTHETIST, CERTIFIED REGISTERED

## 2018-09-17 PROCEDURE — 63600175 PHARM REV CODE 636 W HCPCS: Performed by: ADVANCED PRACTICE MIDWIFE

## 2018-09-17 PROCEDURE — 72100003 HC LABOR CARE, EA. ADDL. 8 HRS

## 2018-09-17 PROCEDURE — 36000685 HC CESAREAN SECTION LEVEL I

## 2018-09-17 PROCEDURE — 25000003 PHARM REV CODE 250: Performed by: ADVANCED PRACTICE MIDWIFE

## 2018-09-17 PROCEDURE — 62326 NJX INTERLAMINAR LMBR/SAC: CPT | Performed by: ANESTHESIOLOGY

## 2018-09-17 PROCEDURE — 59510 CESAREAN DELIVERY: CPT | Mod: GB,,, | Performed by: OBSTETRICS & GYNECOLOGY

## 2018-09-17 PROCEDURE — 85025 COMPLETE CBC W/AUTO DIFF WBC: CPT

## 2018-09-17 RX ORDER — CHLORHEXIDINE GLUCONATE ORAL RINSE 1.2 MG/ML
10 SOLUTION DENTAL 2 TIMES DAILY
Status: DISCONTINUED | OUTPATIENT
Start: 2018-09-17 | End: 2018-09-19 | Stop reason: HOSPADM

## 2018-09-17 RX ORDER — MISOPROSTOL 200 UG/1
600 TABLET ORAL
Status: DISCONTINUED | OUTPATIENT
Start: 2018-09-17 | End: 2018-09-17

## 2018-09-17 RX ORDER — ROPIVACAINE HYDROCHLORIDE 2 MG/ML
INJECTION, SOLUTION EPIDURAL; INFILTRATION; PERINEURAL
Status: DISCONTINUED | OUTPATIENT
Start: 2018-09-17 | End: 2018-09-17

## 2018-09-17 RX ORDER — ROPIVACAINE HYDROCHLORIDE 2 MG/ML
INJECTION, SOLUTION EPIDURAL; INFILTRATION CONTINUOUS
Status: DISCONTINUED | OUTPATIENT
Start: 2018-09-17 | End: 2018-09-17

## 2018-09-17 RX ORDER — IBUPROFEN 600 MG/1
600 TABLET ORAL EVERY 6 HOURS
Status: DISCONTINUED | OUTPATIENT
Start: 2018-09-18 | End: 2018-09-18

## 2018-09-17 RX ORDER — BUTORPHANOL TARTRATE 1 MG/ML
1 INJECTION INTRAMUSCULAR; INTRAVENOUS ONCE
Status: COMPLETED | OUTPATIENT
Start: 2018-09-17 | End: 2018-09-17

## 2018-09-17 RX ORDER — ONDANSETRON HYDROCHLORIDE 2 MG/ML
INJECTION, SOLUTION INTRAMUSCULAR; INTRAVENOUS
Status: DISCONTINUED | OUTPATIENT
Start: 2018-09-17 | End: 2018-09-17

## 2018-09-17 RX ORDER — ADHESIVE BANDAGE
30 BANDAGE TOPICAL 2 TIMES DAILY PRN
Status: DISCONTINUED | OUTPATIENT
Start: 2018-09-18 | End: 2018-09-19 | Stop reason: HOSPADM

## 2018-09-17 RX ORDER — LIDOCAINE HCL/EPINEPHRINE/PF 2%-1:200K
VIAL (ML) INJECTION
Status: DISCONTINUED | OUTPATIENT
Start: 2018-09-17 | End: 2018-09-17

## 2018-09-17 RX ORDER — SODIUM CHLORIDE, SODIUM LACTATE, POTASSIUM CHLORIDE, CALCIUM CHLORIDE 600; 310; 30; 20 MG/100ML; MG/100ML; MG/100ML; MG/100ML
INJECTION, SOLUTION INTRAVENOUS CONTINUOUS PRN
Status: DISCONTINUED | OUTPATIENT
Start: 2018-09-17 | End: 2018-09-17

## 2018-09-17 RX ORDER — PHENYLEPHRINE HYDROCHLORIDE 10 MG/ML
INJECTION INTRAVENOUS
Status: DISCONTINUED | OUTPATIENT
Start: 2018-09-17 | End: 2018-09-17

## 2018-09-17 RX ORDER — MISOPROSTOL 200 UG/1
800 TABLET ORAL
Status: DISCONTINUED | OUTPATIENT
Start: 2018-09-17 | End: 2018-09-17

## 2018-09-17 RX ORDER — CHLORHEXIDINE GLUCONATE ORAL RINSE 1.2 MG/ML
10 SOLUTION DENTAL
Status: DISCONTINUED | OUTPATIENT
Start: 2018-09-17 | End: 2018-09-17

## 2018-09-17 RX ORDER — MORPHINE SULFATE 1 MG/ML
INJECTION, SOLUTION EPIDURAL; INTRATHECAL; INTRAVENOUS
Status: DISCONTINUED | OUTPATIENT
Start: 2018-09-17 | End: 2018-09-17

## 2018-09-17 RX ORDER — SODIUM CHLORIDE, SODIUM LACTATE, POTASSIUM CHLORIDE, CALCIUM CHLORIDE 600; 310; 30; 20 MG/100ML; MG/100ML; MG/100ML; MG/100ML
INJECTION, SOLUTION INTRAVENOUS CONTINUOUS
Status: ACTIVE | OUTPATIENT
Start: 2018-09-17 | End: 2018-09-18

## 2018-09-17 RX ORDER — SODIUM CITRATE AND CITRIC ACID MONOHYDRATE 334; 500 MG/5ML; MG/5ML
30 SOLUTION ORAL
Status: DISCONTINUED | OUTPATIENT
Start: 2018-09-17 | End: 2018-09-17

## 2018-09-17 RX ORDER — OXYTOCIN 10 [USP'U]/ML
INJECTION, SOLUTION INTRAMUSCULAR; INTRAVENOUS
Status: DISCONTINUED | OUTPATIENT
Start: 2018-09-17 | End: 2018-09-17

## 2018-09-17 RX ORDER — HYDROCORTISONE 25 MG/G
CREAM TOPICAL 3 TIMES DAILY PRN
Status: DISCONTINUED | OUTPATIENT
Start: 2018-09-17 | End: 2018-09-19 | Stop reason: HOSPADM

## 2018-09-17 RX ORDER — SODIUM CHLORIDE, SODIUM LACTATE, POTASSIUM CHLORIDE, CALCIUM CHLORIDE 600; 310; 30; 20 MG/100ML; MG/100ML; MG/100ML; MG/100ML
INJECTION, SOLUTION INTRAVENOUS CONTINUOUS
Status: DISCONTINUED | OUTPATIENT
Start: 2018-09-17 | End: 2018-09-17

## 2018-09-17 RX ORDER — HYDROMORPHONE HYDROCHLORIDE 2 MG/ML
1 INJECTION, SOLUTION INTRAMUSCULAR; INTRAVENOUS; SUBCUTANEOUS EVERY 4 HOURS PRN
Status: ACTIVE | OUTPATIENT
Start: 2018-09-17 | End: 2018-09-18

## 2018-09-17 RX ORDER — OXYTOCIN/RINGER'S LACTATE 20/1000 ML
2 PLASTIC BAG, INJECTION (ML) INTRAVENOUS CONTINUOUS
Status: DISCONTINUED | OUTPATIENT
Start: 2018-09-17 | End: 2018-09-17

## 2018-09-17 RX ORDER — BISACODYL 10 MG
10 SUPPOSITORY, RECTAL RECTAL ONCE AS NEEDED
Status: ACTIVE | OUTPATIENT
Start: 2018-09-17 | End: 2018-09-17

## 2018-09-17 RX ORDER — DOCUSATE SODIUM 100 MG/1
200 CAPSULE, LIQUID FILLED ORAL 2 TIMES DAILY PRN
Status: DISCONTINUED | OUTPATIENT
Start: 2018-09-17 | End: 2018-09-19 | Stop reason: HOSPADM

## 2018-09-17 RX ORDER — DIPHENHYDRAMINE HCL 25 MG
25 CAPSULE ORAL EVERY 4 HOURS PRN
Status: DISCONTINUED | OUTPATIENT
Start: 2018-09-17 | End: 2018-09-18 | Stop reason: SDUPTHER

## 2018-09-17 RX ORDER — ROPIVACAINE HYDROCHLORIDE 2 MG/ML
INJECTION, SOLUTION EPIDURAL; INFILTRATION; PERINEURAL CONTINUOUS PRN
Status: DISCONTINUED | OUTPATIENT
Start: 2018-09-17 | End: 2018-09-17

## 2018-09-17 RX ORDER — ONDANSETRON 8 MG/1
8 TABLET, ORALLY DISINTEGRATING ORAL EVERY 8 HOURS PRN
Status: DISCONTINUED | OUTPATIENT
Start: 2018-09-17 | End: 2018-09-19 | Stop reason: HOSPADM

## 2018-09-17 RX ORDER — HYDROCODONE BITARTRATE AND ACETAMINOPHEN 10; 325 MG/1; MG/1
1 TABLET ORAL EVERY 4 HOURS PRN
Status: DISCONTINUED | OUTPATIENT
Start: 2018-09-17 | End: 2018-09-18

## 2018-09-17 RX ORDER — SIMETHICONE 80 MG
1 TABLET,CHEWABLE ORAL EVERY 6 HOURS PRN
Status: DISCONTINUED | OUTPATIENT
Start: 2018-09-17 | End: 2018-09-19 | Stop reason: HOSPADM

## 2018-09-17 RX ORDER — AMOXICILLIN 250 MG
1 CAPSULE ORAL NIGHTLY
Status: DISCONTINUED | OUTPATIENT
Start: 2018-09-17 | End: 2018-09-18

## 2018-09-17 RX ORDER — HYDROCODONE BITARTRATE AND ACETAMINOPHEN 5; 325 MG/1; MG/1
1 TABLET ORAL EVERY 4 HOURS PRN
Status: DISCONTINUED | OUTPATIENT
Start: 2018-09-17 | End: 2018-09-18

## 2018-09-17 RX ORDER — DIPHENHYDRAMINE HYDROCHLORIDE 50 MG/ML
12.5 INJECTION INTRAMUSCULAR; INTRAVENOUS ONCE
Status: COMPLETED | OUTPATIENT
Start: 2018-09-17 | End: 2018-09-17

## 2018-09-17 RX ORDER — ONDANSETRON 8 MG/1
8 TABLET, ORALLY DISINTEGRATING ORAL EVERY 8 HOURS PRN
Status: DISCONTINUED | OUTPATIENT
Start: 2018-09-17 | End: 2018-09-17

## 2018-09-17 RX ORDER — OXYTOCIN/RINGER'S LACTATE 20/1000 ML
41.65 PLASTIC BAG, INJECTION (ML) INTRAVENOUS CONTINUOUS
Status: DISPENSED | OUTPATIENT
Start: 2018-09-17 | End: 2018-09-18

## 2018-09-17 RX ORDER — CEFAZOLIN SODIUM 2 G/50ML
2 SOLUTION INTRAVENOUS
Status: DISCONTINUED | OUTPATIENT
Start: 2018-09-17 | End: 2018-09-17

## 2018-09-17 RX ORDER — SODIUM CHLORIDE 9 MG/ML
INJECTION, SOLUTION INTRAVENOUS
Status: DISCONTINUED | OUTPATIENT
Start: 2018-09-17 | End: 2018-09-17

## 2018-09-17 RX ADMIN — SODIUM CHLORIDE, SODIUM LACTATE, POTASSIUM CHLORIDE, AND CALCIUM CHLORIDE: .6; .31; .03; .02 INJECTION, SOLUTION INTRAVENOUS at 05:09

## 2018-09-17 RX ADMIN — SODIUM CHLORIDE, SODIUM LACTATE, POTASSIUM CHLORIDE, AND CALCIUM CHLORIDE: .6; .31; .03; .02 INJECTION, SOLUTION INTRAVENOUS at 06:09

## 2018-09-17 RX ADMIN — HYDROCODONE BITARTRATE AND ACETAMINOPHEN 1 TABLET: 10; 325 TABLET ORAL at 11:09

## 2018-09-17 RX ADMIN — CEFAZOLIN 2 G: 1 INJECTION, POWDER, FOR SOLUTION INTRAMUSCULAR; INTRAVENOUS at 08:09

## 2018-09-17 RX ADMIN — DIPHENHYDRAMINE HYDROCHLORIDE 12.5 MG: 50 INJECTION, SOLUTION INTRAMUSCULAR; INTRAVENOUS at 04:09

## 2018-09-17 RX ADMIN — MORPHINE SULFATE 3 MG: 1 INJECTION, SOLUTION EPIDURAL; INTRATHECAL; INTRAVENOUS at 09:09

## 2018-09-17 RX ADMIN — ROPIVACAINE HYDROCHLORIDE 14 ML/HR: 2 INJECTION, SOLUTION EPIDURAL; INFILTRATION at 06:09

## 2018-09-17 RX ADMIN — AZITHROMYCIN MONOHYDRATE 500 MG: 500 INJECTION, POWDER, LYOPHILIZED, FOR SOLUTION INTRAVENOUS at 08:09

## 2018-09-17 RX ADMIN — LIDOCAINE HYDROCHLORIDE,EPINEPHRINE BITARTRATE 15 ML: 20; .005 INJECTION, SOLUTION EPIDURAL; INFILTRATION; INTRACAUDAL; PERINEURAL at 07:09

## 2018-09-17 RX ADMIN — OXYTOCIN 20 UNITS: 10 INJECTION, SOLUTION INTRAMUSCULAR; INTRAVENOUS at 08:09

## 2018-09-17 RX ADMIN — Medication 41.65 MILLI-UNITS/MIN: at 11:09

## 2018-09-17 RX ADMIN — PHENYLEPHRINE HYDROCHLORIDE 100 MCG: 10 INJECTION INTRAVENOUS at 08:09

## 2018-09-17 RX ADMIN — SODIUM CHLORIDE, SODIUM LACTATE, POTASSIUM CHLORIDE, AND CALCIUM CHLORIDE 1000 ML: .6; .31; .03; .02 INJECTION, SOLUTION INTRAVENOUS at 06:09

## 2018-09-17 RX ADMIN — ROPIVACAINE HYDROCHLORIDE 14 ML: 2 INJECTION, SOLUTION EPIDURAL; INFILTRATION at 06:09

## 2018-09-17 RX ADMIN — DEXTROSE, SODIUM CHLORIDE, SODIUM LACTATE, POTASSIUM CHLORIDE, AND CALCIUM CHLORIDE: 5; .6; .31; .03; .02 INJECTION, SOLUTION INTRAVENOUS at 02:09

## 2018-09-17 RX ADMIN — ONDANSETRON 4 MG: 2 INJECTION, SOLUTION INTRAMUSCULAR; INTRAVENOUS at 08:09

## 2018-09-17 RX ADMIN — SODIUM CHLORIDE, SODIUM LACTATE, POTASSIUM CHLORIDE, AND CALCIUM CHLORIDE: 600; 310; 30; 20 INJECTION, SOLUTION INTRAVENOUS at 08:09

## 2018-09-17 RX ADMIN — BUTORPHANOL TARTRATE 1 MG: 1 INJECTION, SOLUTION INTRAMUSCULAR; INTRAVENOUS at 03:09

## 2018-09-17 NOTE — PLAN OF CARE
Problem: Labor (Cervical Ripen, Induct, Augment) (Adult,Obstetrics,Pediatric)  Intervention: Position/Reposition to Promote Fetal Well Being/Optimize Contraction Pattern   18 06   Maternal Comfort Interventions   Activity In Labor up in room ad donald     Intervention: Monitor/Manage Maternal Hemodynamic Stability   18 06   Reproductive Interventions    Bleed Management Rh status confirmed       Goal: Signs and Symptoms of Listed Potential Problems Will be Absent, Minimized or Managed (Labor)  Signs and symptoms of listed potential problems will be absent, minimized or managed by discharge/transition of care (reference Labor (Cervical Ripen, Induct, Augment) (Adult,Obstetrics,Pediatric) CPG).   18   Labor (Cervical Ripen, Induct, Augment)   Problems Assessed (Labor) all   Problems Present (Labor) none

## 2018-09-17 NOTE — ASSESSMENT & PLAN NOTE
Admit, planning ncb, tolac   0800 active labor, ok to labor in the tub.   1200 Coping well with contractions. Feeling intermittently pushy. Continue present management  1555 SVE no cervical change, cervix edematous. Pain medication given and pt wants to rest.

## 2018-09-17 NOTE — PROGRESS NOTES
[facility]  Obstetrics  Labor Progress Note    Patient Name: Sheree Melgar  MRN: [id number]  Admission Date: 2018  Hospital Length of Stay: 0 days  Attending Physician: Mae Chen, *  Primary Care Provider: Primary Doctor No    Subjective:     Principal Problem:Amniotic fluid leaking    Hospital Course:  Admit, continuous efm   0800 active labor, beth well. Reviewed birth plan.   1200 Coping well, feeling intermittently pushy with contractions    Interval History:  Sheree is a 28 y.o.  at 41w4d. She is doing well, in tub. Coping well with contractions.     Objective:     Vital Signs (Most Recent):  Temp: 98.5 °F (36.9 °C) (18 1200)  Pulse: 92 (18 0652)  Resp: 20 (18 1200)  BP: 133/78 (18 0652) Vital Signs (24h Range):  Temp:  [97.5 °F (36.4 °C)-98.6 °F (37 °C)] 98.5 °F (36.9 °C)  Pulse:  [] 92  Resp:  [18-20] 20  BP: (133)/(78-84) 133/78        There is no height or weight on file to calculate BMI.    FHT: 140 Cat 2 (no accels or decels, minimal variability)  TOCO: Q 2-5 minutes    Cervical Exam:  deferred     Significant Labs:  Lab Results   Component Value Date    GROUPTRH A POS 2018    HEPBSAG Negative 2018    STREPBCULT No Group B Streptococcus isolated 2018       None    Physical Exam:   Constitutional: She is oriented to person, place, and time.        Pulmonary/Chest: Effort normal.                  Musculoskeletal: Normal range of motion and moves all extremeties.       Neurological: She is alert and oriented to person, place, and time.         Assessment/Plan:     28 y.o. female  at 41w4d for:    * Amniotic fluid leaking    Admit, planning ncb, tolac   0800 active labor, ok to labor in the tub.   1200 Coping well with contractions. Feeling intermittently pushy. Continue present management              Hilda Hernández CNM  Obstetrics  [facility]

## 2018-09-17 NOTE — PROGRESS NOTES
[facility]   Obstetrics  Labor Progress Note    Patient Name: Sheree Melgar  MRN: [id number]  Admission Date: 2018  Hospital Length of Stay: 0 days  Attending Physician: Mae Chen, *  Primary Care Provider: Primary Doctor No    Subjective:     Principal Problem:Amniotic fluid leaking    Hospital Course:  Admit, continuous efm   0800 active labor, beth well. Reviewed birth plan.     Interval History:  Sheree is a 28 y.o.  at 41w4d. She is doing well. Coping well with contractions. Family and  at bedside.     Objective:     Vital Signs (Most Recent):  Temp: 97.5 °F (36.4 °C) (18 07)  Pulse: 92 (18 0652)  Resp: 20 (18 07)  BP: 133/78 (18 0652) Vital Signs (24h Range):  Temp:  [97.5 °F (36.4 °C)-98.6 °F (37 °C)] 97.5 °F (36.4 °C)  Pulse:  [] 92  Resp:  [20] 20  BP: (133)/(78-84) 133/78        There is no height or weight on file to calculate BMI.    FHT: 145 Cat 2 (reassuring)  TOCO: Q 2-4 minutes    Cervical Exam:  Dilation:  7.5  Effacement:  80  Station: -2  Presentation: Vertex     Significant Labs:  Lab Results   Component Value Date    GROUPTRH A POS 2018    HEPBSAG Negative 2018    STREPBCULT No Group B Streptococcus isolated 2018       I have personallly reviewed all pertinent lab results from the last 24 hours.  Recent Lab Results       18  0530      Baso # 0.01     Basophil% 0.1     Differential Method Automated     Eos # 0.1     Eosinophil% 0.7     Gran # (ANC) 6.3     Gran% 74.1     Group & Rh A POS     Hematocrit 31.4     Hemoglobin 9.9     INDIRECT LUCI NEG     Lymph # 1.6     Lymph% 19.0     MCH 26.3     MCHC 31.5     MCV 83     Mono # 0.5     Mono% 6.1     MPV 10.6     Platelets 247     RBC 3.77     RDW 14.8     WBC 8.49           Physical Exam:   Constitutional: She is oriented to person, place, and time. She appears well-developed and well-nourished.    HENT:   Head:  Normocephalic and atraumatic.      Cardiovascular: Normal rate.     Pulmonary/Chest: Effort normal.        Abdominal: Soft.   S=D     Genitourinary: Vagina normal.   Genitourinary Comments: Clear fluid.              Musculoskeletal: Normal range of motion and moves all extremeties.       Neurological: She is alert and oriented to person, place, and time.    Skin: Skin is warm and dry.    Psychiatric: She has a normal mood and affect. Her behavior is normal. Thought content normal.       Assessment/Plan:     28 y.o. female  at 41w4d for:    * Amniotic fluid leaking    Admit, planning ncb, tolac   0800 active labor, ok to labor in the tub.               Hilda Hernández CNM  Obstetrics  [facility]

## 2018-09-17 NOTE — PROGRESS NOTES
US today EFW 8#13oz, BPP , hx of previous c/s, failure to descend, really desires , planning natural birth, discussed IOL options since post term. PT ok with cooks catheter placement and possible pitocin. #1 desire is vaginal birth, #2 is natural birth vs epidural. IOL scheduled for Monday, if pt decides to postpone, she will let me know and BPP will be repeated on Monday. al

## 2018-09-17 NOTE — ASSESSMENT & PLAN NOTE
Admit, planning ncb, tolac   0800 active labor, ok to labor in the tub.   1200 Coping well with contractions. Feeling intermittently pushy. Continue present management

## 2018-09-17 NOTE — HOSPITAL COURSE
Admit, continuous efm   0800 active labor, beth well. Reviewed birth plan.   1200 Coping well, feeling intermittently pushy with contractions  1550 Pt not coping well, tearful, unsure if she can continue. Requesting SVE    193 there is poor progression in labor - cervix is about 4cm now, due to edema. Vertex at -3. Discussed this w pt and family. Mary Carmen carrera was present. Reviewed r/a/b of continuing the trial of labor versus  delivery. Recommend . Pt understands, q's answered. Desires to proceed w c/s. discusssed r/a/b and consents signed.       2018   Pre-operative Diagnosis:  Spontaneous rutpure of membranes, previous uterine incision previous  delivery, failed trial of labor: no dilation beyond 6cm or descent beyond -3 station  Post-operative Diagnosis: same  PROCEDURE: repeat low transverse  section  EBL 200mL, no complications    2018  POD#1 s/p repeat LTCS after failed TOLAC.  Patient is up out of bed this am, but caceres still in place.  Will have nurse remove this am.  She is not passing flatus as of yet.  Pain control adequate.  No nausea/vomiting with ice chips.  Advance diet.  Encourage ambulation  Vistaril for itching- benadryl ineffective

## 2018-09-17 NOTE — PROGRESS NOTES
[facility] -   Obstetrics  Labor Progress Note    Patient Name: Sheree Melgar  MRN: [id number]  Admission Date: 2018  Hospital Length of Stay: 0 days  Attending Physician: Mae Chen, *  Primary Care Provider: Primary Doctor No    Subjective:     Principal Problem:Amniotic fluid leaking    Hospital Course:  Admit, continuous efm   0800 active labor, beth well. Reviewed birth plan.   1200 Coping well, feeling intermittently pushy with contractions  1550 Pt not coping well, tearful, unsure if she can continue. Requesting SVE    Interval History:  Sheree is a 28 y.o.  at 41w4d. She is not doing well right now, tearful, questioning if the baby will fit. Requesting pain medication.     Objective:     Vital Signs (Most Recent):  Temp: 98.6 °F (37 °C) (18 1500)  Pulse: 92 (18 0652)  Resp: 20 (18 1500)  BP: 133/78 (18 0652) Vital Signs (24h Range):  Temp:  [97.5 °F (36.4 °C)-98.6 °F (37 °C)] 98.6 °F (37 °C)  Pulse:  [] 92  Resp:  [18-20] 20  BP: (133)/(78-84) 133/78        There is no height or weight on file to calculate BMI.    FHT: 135 Cat 1 (reassuring)  TOCO: Q 4-5 minutes    Cervical Exam:  Dilation:  6 edematous  Effacement:  75%  Station: -3  Presentation: Vertex     Significant Labs:  Lab Results   Component Value Date    GROUPTRH A POS 2018    HEPBSAG Negative 2018    STREPBCULT No Group B Streptococcus isolated 2018       I have personallly reviewed all pertinent lab results from the last 24 hours.    Physical Exam:   Constitutional: She appears distressed.               Genitourinary: Vagina normal.                 Psychiatric: Her mood appears anxious.       Assessment/Plan:     28 y.o. female  at 41w4d for:    * Amniotic fluid leaking    Admit, planning ncb, tolac   0800 active labor, ok to labor in the tub.   1200 Coping well with contractions. Feeling intermittently pushy. Continue present  management  1555 SVE no cervical change, cervix edematous. Pain medication given and pt wants to rest.              Hilda Hernández CNM  Obstetrics  [facility]

## 2018-09-17 NOTE — H&P
[facility]  Obstetrics  History & Physical    Patient Name: Sheree Melgar  MRN: [id number]  Admission Date: 2018  Primary Care Provider: Primary Doctor No    Subjective:     Principal Problem:Amniotic fluid leaking    History of Present Illness:  srom at 300am ctx Q 3-6    Obstetric HPI:  Patient is a , prev c/s x 1 planning on TOLAC SROM  AM presents with ctx Q 3-7, active fetal movement, No vaginal bleeding , Yes loss of fluid     This pregnancy has been complicated by prev c/s x 1 planning tolac, polyhydramnios, and post term pregnancy.     Obstetric History       T1      L1     SAB1   TAB0   Ectopic0   Multiple0   Live Births1       # Outcome Date GA Lbr Jim/2nd Weight Sex Delivery Anes PTL Lv   3 Current            2 SAB            1 Term      CS-LTranv   TUTU        Past Medical History:   Diagnosis Date    Abnormal Pap smear     LGSIL 3/13    Abnormal Pap smear of cervix     Abnormal Pap smear of vagina     History of  delivery, currently pregnant 2018    Documented LTCS in legacy documents at [facility] for failure to descend, fetal tachycardia, and maternal febrile morbidity.  Pt interested in .     Past Surgical History:   Procedure Laterality Date     SECTION  3/16/12    COLPOSCOPY         PTA Medications   Medication Sig    evening primrose oil (EVENING PRIMROSE ORAL)     prenatal vit,dayanara 74/iron/folic (PRENATAL VITAMIN 1+1 ORAL) Take by mouth.       Review of patient's allergies indicates:  No Known Allergies     Family History     None        Tobacco Use    Smoking status: Never Smoker    Smokeless tobacco: Never Used   Substance and Sexual Activity    Alcohol use: Yes     Comment: socially    Drug use: No    Sexual activity: Yes     Partners: Male     Birth control/protection: None     Review of Systems   Gastrointestinal: Positive for abdominal pain.   Genitourinary: Negative for vaginal bleeding.   All other  systems reviewed and are negative.     Objective:     Vital Signs (Most Recent):    Vital Signs (24h Range):           There is no height or weight on file to calculate BMI.    FHT: 130Cat 2, decreased variability  TOCO:  Q 2-7 minutes    Physical Exam:   Constitutional: She is oriented to person, place, and time. She appears well-developed and well-nourished.    HENT:   Head: Normocephalic.    Eyes: Pupils are equal, round, and reactive to light.    Neck: Normal range of motion.    Cardiovascular: Normal rate and regular rhythm.     Pulmonary/Chest: Effort normal.        Abdominal: Soft.     Genitourinary: Vagina normal and uterus normal.           Musculoskeletal: Normal range of motion.       Neurological: She is alert and oriented to person, place, and time.    Skin: Skin is warm and dry.    Psychiatric: She has a normal mood and affect. Her behavior is normal. Judgment and thought content normal.     Attempted to do ve, pt's refusing ve now, requesting to wait for the day CNM.   Cervix: Pt refused  Dilation:    Effacement:    Station:   Presentation:      Significant Labs:  Lab Results   Component Value Date    GROUPTRH A POS 2018    HEPBSAG Negative 2018    STREPBCULT No Group B Streptococcus isolated 2018       I have personallly reviewed all pertinent lab results from the last 24 hours.    Assessment/Plan:     28 y.o. female  at 41w4d for:    * Amniotic fluid leaking    ADMIT, Planning ncb, tolac             Cristiane Hamilton, CNM  Obstetrics  [facility]

## 2018-09-17 NOTE — NURSING
Pt. Admitted to LD for ROM. PT. Declines SVE after education from nurse and CNM. Pt. Educated by CNM about the importance of continuous monitoring and a need for IV hydration after reviewing the strip. 2 Doulas at bedside.  recommended to pt. To refuse IV fluids and drink PO after education by CNM and RN.

## 2018-09-17 NOTE — SUBJECTIVE & OBJECTIVE
Interval History:  Sheree is a 28 y.o.  at 41w4d. She is not doing well right now, tearful, questioning if the baby will fit. Requesting pain medication.     Objective:     Vital Signs (Most Recent):  Temp: 98.6 °F (37 °C) (18 1500)  Pulse: 92 (18 0652)  Resp: 20 (18 1500)  BP: 133/78 (18 0652) Vital Signs (24h Range):  Temp:  [97.5 °F (36.4 °C)-98.6 °F (37 °C)] 98.6 °F (37 °C)  Pulse:  [] 92  Resp:  [18-20] 20  BP: (133)/(78-84) 133/78        There is no height or weight on file to calculate BMI.    FHT: 135 Cat 1 (reassuring)  TOCO: Q 4-5 minutes    Cervical Exam:  Dilation:  6 edematous  Effacement:  75%  Station: -3  Presentation: Vertex     Significant Labs:  Lab Results   Component Value Date    GROUPTRH A POS 2018    HEPBSAG Negative 2018    STREPBCULT No Group B Streptococcus isolated 2018       I have personallly reviewed all pertinent lab results from the last 24 hours.    Physical Exam:   Constitutional: She appears distressed.               Genitourinary: Vagina normal.                 Psychiatric: Her mood appears anxious.

## 2018-09-17 NOTE — SUBJECTIVE & OBJECTIVE
Obstetric HPI:  Patient is a , prev c/s x 1 planning on TOLAC SROM  AM presents with ctx Q 3-7, active fetal movement, No vaginal bleeding , Yes loss of fluid     This pregnancy has been complicated by prev c/s x 1 planning tolac, polyhydramnios, and post term pregnancy.     Obstetric History       T1      L1     SAB1   TAB0   Ectopic0   Multiple0   Live Births1       # Outcome Date GA Lbr Jim/2nd Weight Sex Delivery Anes PTL Lv   3 Current            2 SAB            1 Term      CS-LTranv   TUTU        Past Medical History:   Diagnosis Date    Abnormal Pap smear     LGSIL 3/13    Abnormal Pap smear of cervix     Abnormal Pap smear of vagina     History of  delivery, currently pregnant 2018    Documented LTCS in legacy documents at [facility] for failure to descend, fetal tachycardia, and maternal febrile morbidity.  Pt interested in .     Past Surgical History:   Procedure Laterality Date     SECTION  3/16/12    COLPOSCOPY         PTA Medications   Medication Sig    evening primrose oil (EVENING PRIMROSE ORAL)     prenatal vit,dayanara 74/iron/folic (PRENATAL VITAMIN 1+1 ORAL) Take by mouth.       Review of patient's allergies indicates:  No Known Allergies     Family History     None        Tobacco Use    Smoking status: Never Smoker    Smokeless tobacco: Never Used   Substance and Sexual Activity    Alcohol use: Yes     Comment: socially    Drug use: No    Sexual activity: Yes     Partners: Male     Birth control/protection: None     Review of Systems   Gastrointestinal: Positive for abdominal pain.   Genitourinary: Negative for vaginal bleeding.   All other systems reviewed and are negative.     Objective:     Vital Signs (Most Recent):    Vital Signs (24h Range):           There is no height or weight on file to calculate BMI.    FHT: 130Cat 2, decreased variability  TOCO:  Q 2-7 minutes    Physical Exam:   Constitutional: She is oriented to person, place, and  time. She appears well-developed and well-nourished.    HENT:   Head: Normocephalic.    Eyes: Pupils are equal, round, and reactive to light.    Neck: Normal range of motion.    Cardiovascular: Normal rate and regular rhythm.     Pulmonary/Chest: Effort normal.        Abdominal: Soft.     Genitourinary: Vagina normal and uterus normal.           Musculoskeletal: Normal range of motion.       Neurological: She is alert and oriented to person, place, and time.    Skin: Skin is warm and dry.    Psychiatric: She has a normal mood and affect. Her behavior is normal. Judgment and thought content normal.     Attempted to do ve, pt's refusing ve now, requesting to wait for the day CNM.   Cervix: Pt refused  Dilation:    Effacement:    Station:   Presentation:      Significant Labs:  Lab Results   Component Value Date    GROUPTRH A POS 01/19/2018    HEPBSAG Negative 01/19/2018    STREPBCULT No Group B Streptococcus isolated 08/14/2018       I have personallly reviewed all pertinent lab results from the last 24 hours.

## 2018-09-17 NOTE — ANESTHESIA PREPROCEDURE EVALUATION
2018  Sheree Melgar is a 28 y.o., female.    Pre-op Assessment    I have reviewed the Patient Summary Reports.     I have reviewed the Nursing Notes.   I have reviewed the Medications.     Review of Systems  Anesthesia Hx:  No problems with previous Anesthesia  Denies Family Hx of Anesthesia complications.   Denies Personal Hx of Anesthesia complications.   Social:  Non-Smoker, No Alcohol Use    Hematology/Oncology:  Hematology Normal   Oncology Normal     EENT/Dental:EENT/Dental Normal   Cardiovascular:  Cardiovascular Normal     Pulmonary:   Asthma childhood   Renal/:  Renal/ Normal     Hepatic/GI:  Hepatic/GI Normal    Neurological:   scoliosis   Dermatological:  Skin Normal    Psych:  Psychiatric Normal           Physical Exam  General:  Well nourished    Airway/Jaw/Neck:  Airway Findings: Mouth Opening: Normal Tongue: Normal  General Airway Assessment: Adult  Mallampati: II  Improves to II with phonation.  TM Distance: Normal, at least 6 cm      Dental:  Dental Findings: In tact        Mental Status:  Mental Status Findings:  Cooperative         Anesthesia Plan  Type of Anesthesia, risks & benefits discussed:  Anesthesia Type:  epidural  Patient's Preference:   Intra-op Monitoring Plan:   Intra-op Monitoring Plan Comments:   Post Op Pain Control Plan:   Post Op Pain Control Plan Comments:   Induction:    Beta Blocker:  Patient is not currently on a Beta-Blocker (No further documentation required).       Informed Consent: Patient understands risks and agrees with Anesthesia plan.  Questions answered. Anesthesia consent signed with patient.  ASA Score: 2     Day of Surgery Review of History & Physical: I have interviewed and examined the patient. I have reviewed the patient's H&P dated:  There are no significant changes.      Anesthesia Plan Notes:

## 2018-09-17 NOTE — SUBJECTIVE & OBJECTIVE
Interval History:  Sheree is a 28 y.o.  at 41w4d. She is doing well. Coping well with contractions. Family and  at bedside.     Objective:     Vital Signs (Most Recent):  Temp: 97.5 °F (36.4 °C) (18 07)  Pulse: 92 (18 0652)  Resp: 20 (18 07)  BP: 133/78 (18 0652) Vital Signs (24h Range):  Temp:  [97.5 °F (36.4 °C)-98.6 °F (37 °C)] 97.5 °F (36.4 °C)  Pulse:  [] 92  Resp:  [20] 20  BP: (133)/(78-84) 133/78        There is no height or weight on file to calculate BMI.    FHT: 145 Cat 2 (reassuring)  TOCO: Q 2-4 minutes    Cervical Exam:  Dilation:  7.5  Effacement:  80  Station: -2  Presentation: Vertex     Significant Labs:  Lab Results   Component Value Date    GROUPTRH A POS 2018    HEPBSAG Negative 2018    STREPBCULT No Group B Streptococcus isolated 2018       I have personallly reviewed all pertinent lab results from the last 24 hours.  Recent Lab Results       18  0530      Baso # 0.01     Basophil% 0.1     Differential Method Automated     Eos # 0.1     Eosinophil% 0.7     Gran # (ANC) 6.3     Gran% 74.1     Group & Rh A POS     Hematocrit 31.4     Hemoglobin 9.9     INDIRECT LUCI NEG     Lymph # 1.6     Lymph% 19.0     MCH 26.3     MCHC 31.5     MCV 83     Mono # 0.5     Mono% 6.1     MPV 10.6     Platelets 247     RBC 3.77     RDW 14.8     WBC 8.49           Physical Exam:   Constitutional: She is oriented to person, place, and time. She appears well-developed and well-nourished.    HENT:   Head: Normocephalic and atraumatic.      Cardiovascular: Normal rate.     Pulmonary/Chest: Effort normal.        Abdominal: Soft.   S=D     Genitourinary: Vagina normal.   Genitourinary Comments: Clear fluid.              Musculoskeletal: Normal range of motion and moves all extremeties.       Neurological: She is alert and oriented to person, place, and time.    Skin: Skin is warm and dry.    Psychiatric: She has a normal mood and affect. Her  behavior is normal. Thought content normal.

## 2018-09-17 NOTE — ANESTHESIA PROCEDURE NOTES
Epidural    Patient location during procedure: OB   Reason for block: primary anesthetic   Diagnosis: labor/   Start time: 2018 5:54 PM  Timeout: 2018 5:54 PM  End time: 2018 6:29 PM  Staffing  Anesthesiologist: Magalis Díaz MD  Performed: anesthesiologist   Preanesthetic Checklist  Completed: patient identified, surgical consent, pre-op evaluation, timeout performed, IV checked, risks and benefits discussed, monitors and equipment checked, anesthesia consent given, hand hygiene performed and patient being monitored  Preparation  Patient position: sitting  Prep: Betadine  Patient monitoring: Pulse Ox and Blood Pressure  Epidural  Skin Anesthetic: lidocaine 1%  Administration type: continuous  Approach: midline  Interspace: L4-5  Injection technique: TIERA air  Needle and Epidural Catheter  Needle type: Tuohy   Needle gauge: 17  Needle length: 3.5 inches  Catheter type: springwound  Catheter size: 19 G  Catheter at skin depth: 11 cm  Additional Documentation: incremental injection, negative aspiration for heme and CSF, no signs/symptoms of IV or SA injection, no significant pain on injection, no paresthesia on injection and no significant complaints from patient  Needle localization: anatomical landmarks

## 2018-09-17 NOTE — PROGRESS NOTES
Pt admitted for srom and ?labor. Introduced myself to the pt , fly, and Doulas x 2. Discussed the need for ve to determine baseline ve and the poc with the OB/GYN. Pt refusing ve now requesting to wait until new CNM at 600 am. Cat 2 efm with decreased variability noted, refusing IV Fluids per 's suggestion. Pt aware of cat 2 strip and reviewed the need for IV hydration at time. Pt requesting juice.   Pt/family/ x 2 aware of TOLAC and policy of cefm.     Discussed ireg ctx pattern and possibility for the need for pumping  And or Pitocin

## 2018-09-17 NOTE — SUBJECTIVE & OBJECTIVE
Interval History:  Sheree is a 28 y.o.  at 41w4d. She is doing well, in tub. Coping well with contractions.     Objective:     Vital Signs (Most Recent):  Temp: 98.5 °F (36.9 °C) (18 1200)  Pulse: 92 (18 0652)  Resp: 20 (18 1200)  BP: 133/78 (18 0652) Vital Signs (24h Range):  Temp:  [97.5 °F (36.4 °C)-98.6 °F (37 °C)] 98.5 °F (36.9 °C)  Pulse:  [] 92  Resp:  [18-20] 20  BP: (133)/(78-84) 133/78        There is no height or weight on file to calculate BMI.    FHT: 140 Cat 2 (no accels or decels, minimal variability)  TOCO: Q 2-5 minutes    Cervical Exam:  deferred     Significant Labs:  Lab Results   Component Value Date    GROUPTRH A POS 2018    HEPBSAG Negative 2018    STREPBCULT No Group B Streptococcus isolated 2018       None    Physical Exam:   Constitutional: She is oriented to person, place, and time.        Pulmonary/Chest: Effort normal.                  Musculoskeletal: Normal range of motion and moves all extremeties.       Neurological: She is alert and oriented to person, place, and time.

## 2018-09-18 PROBLEM — O42.90 AMNIOTIC FLUID LEAKING: Status: RESOLVED | Noted: 2018-09-17 | Resolved: 2018-09-18

## 2018-09-18 PROBLEM — O48.0 POST TERM PREGNANCY OVER 40 WEEKS: Status: RESOLVED | Noted: 2018-09-14 | Resolved: 2018-09-18

## 2018-09-18 PROBLEM — O40.3XX0 POLYHYDRAMNIOS AFFECTING PREGNANCY IN THIRD TRIMESTER: Status: RESOLVED | Noted: 2018-08-29 | Resolved: 2018-09-18

## 2018-09-18 PROCEDURE — 25000003 PHARM REV CODE 250: Performed by: PHYSICIAN ASSISTANT

## 2018-09-18 PROCEDURE — 63600175 PHARM REV CODE 636 W HCPCS: Performed by: OBSTETRICS & GYNECOLOGY

## 2018-09-18 PROCEDURE — 99024 POSTOP FOLLOW-UP VISIT: CPT | Mod: ,,, | Performed by: PHYSICIAN ASSISTANT

## 2018-09-18 PROCEDURE — 25000003 PHARM REV CODE 250: Performed by: OBSTETRICS & GYNECOLOGY

## 2018-09-18 PROCEDURE — 11000001 HC ACUTE MED/SURG PRIVATE ROOM

## 2018-09-18 RX ORDER — HYDROXYZINE PAMOATE 50 MG/1
100 CAPSULE ORAL EVERY 8 HOURS PRN
Status: DISCONTINUED | OUTPATIENT
Start: 2018-09-18 | End: 2018-09-19

## 2018-09-18 RX ORDER — HYDROXYZINE PAMOATE 25 MG/1
25 CAPSULE ORAL ONCE
Status: COMPLETED | OUTPATIENT
Start: 2018-09-18 | End: 2018-09-18

## 2018-09-18 RX ORDER — OXYCODONE AND ACETAMINOPHEN 5; 325 MG/1; MG/1
1 TABLET ORAL EVERY 4 HOURS PRN
Status: DISCONTINUED | OUTPATIENT
Start: 2018-09-18 | End: 2018-09-19 | Stop reason: HOSPADM

## 2018-09-18 RX ORDER — AMOXICILLIN 250 MG
1 CAPSULE ORAL DAILY
Status: DISCONTINUED | OUTPATIENT
Start: 2018-09-18 | End: 2018-09-19 | Stop reason: HOSPADM

## 2018-09-18 RX ORDER — IBUPROFEN 800 MG/1
800 TABLET ORAL 3 TIMES DAILY
Status: DISCONTINUED | OUTPATIENT
Start: 2018-09-18 | End: 2018-09-19 | Stop reason: HOSPADM

## 2018-09-18 RX ORDER — KETOROLAC TROMETHAMINE 30 MG/ML
30 INJECTION, SOLUTION INTRAMUSCULAR; INTRAVENOUS EVERY 6 HOURS
Status: COMPLETED | OUTPATIENT
Start: 2018-09-18 | End: 2018-09-18

## 2018-09-18 RX ADMIN — KETOROLAC TROMETHAMINE 30 MG: 30 INJECTION INTRAMUSCULAR; INTRAVENOUS at 02:09

## 2018-09-18 RX ADMIN — KETOROLAC TROMETHAMINE 30 MG: 30 INJECTION INTRAMUSCULAR; INTRAVENOUS at 08:09

## 2018-09-18 RX ADMIN — HYDROXYZINE PAMOATE 100 MG: 50 CAPSULE ORAL at 02:09

## 2018-09-18 RX ADMIN — CHLORHEXIDINE GLUCONATE 10 ML: 1.2 RINSE ORAL at 08:09

## 2018-09-18 RX ADMIN — DIPHENHYDRAMINE HYDROCHLORIDE 25 MG: 25 CAPSULE ORAL at 04:09

## 2018-09-18 RX ADMIN — DOCUSATE SODIUM AND SENNOSIDES 1 TABLET: 8.6; 5 TABLET, FILM COATED ORAL at 08:09

## 2018-09-18 RX ADMIN — IBUPROFEN 800 MG: 800 TABLET ORAL at 08:09

## 2018-09-18 RX ADMIN — DIPHENHYDRAMINE HYDROCHLORIDE 25 MG: 25 CAPSULE ORAL at 12:09

## 2018-09-18 RX ADMIN — IBUPROFEN 600 MG: 600 TABLET ORAL at 12:09

## 2018-09-18 RX ADMIN — DIPHENHYDRAMINE HYDROCHLORIDE 25 MG: 25 CAPSULE ORAL at 10:09

## 2018-09-18 RX ADMIN — HYDROXYZINE PAMOATE 25 MG: 25 CAPSULE ORAL at 08:09

## 2018-09-18 RX ADMIN — OXYCODONE HYDROCHLORIDE AND ACETAMINOPHEN 1 TABLET: 5; 325 TABLET ORAL at 02:09

## 2018-09-18 NOTE — PLAN OF CARE
Problem: Patient Care Overview  Goal: Plan of Care Review  Outcome: Ongoing (interventions implemented as appropriate)  Pt stabilized bonding with infant. Fundus firm without massage with light bleeding. Pressure dressing WDL. Breast feeding infant with nurse assistance. Infant very sleepy after 2 hr feeding. Educated on S2S and demonstrated hand expression. Also educated mother on placing infant in crib when sleepy. Rios catheter still in place. Pain controlled with IV pain medication. VSS. Safety maintained. Will continue to monitor process.

## 2018-09-18 NOTE — LACTATION NOTE
Lactation Rounds:    Lactation packet given and admit information reviewed. Mother verbalizes understanding of expected  behaviors and output for the first 48 hours of life.  Discussed the importance of cue based feedings on demand, unrestricted access to the breast, and frequent uninterrupted skin to skin contact.  Risk and implications of artificial nipples and non medically indicated formula supplementation discussed.      Baby is showing feeding cues. Redness and blisters noted to Bilateral nipples, mother reports previous shallow latches. Common vs uncommon nipple pain discussed, mother verbalized understanding.Helped mother to settle in a football hold position on the right breast. Reviewed deep asymmetric latch and proper positioning. Multiple latch and relatch attempts. Infant continues to self detach. nipple shape  is WDL upon unlatching. Infant rooting.    Helped mother settle into cross cradle hold on left breast. Mother reports pain 6/10 with initial latch. Infant noted to have bottom lip rolled inward. Bottom lip flanged out. Mother able to return demonstrate. Mother reports pain decreased 2/10. Audible swallows noted. Infant sleepy at breast. Mother unlatched infant properly.      Reviewed hand expression and nipple care; mother able to return demonstrate.    Encouraged mother to call for assistance when desired or when infant is showing signs of hunger, contact number provided, mother verbalizes understanding.       18 1100   Maternal Infant Assessment   Breast Shape Bilateral:;round   Breast Density Bilateral:;soft   Areola Bilateral:;firm   Nipple(s) Bilateral:;scabbed;everted   Nipple Symptoms redness;bilateral:   Infant Assessment   Sucking Reflex present   Rooting Reflex present   Swallow Reflex present   Skin Color color per gestational age   Number of Stools (24 hours) 3   Number of Voids (24 hours) 1   LATCH Score   Latch 1-->repeated attempts, holds nipple in mouth, stimulate to  suck   Audible Swallowing 1-->a few with stimulation   Type Of Nipple 2-->everted (after stimulation)   Comfort (Breast/Nipple) 1-->filling, red/small blisters/bruises, mild/mod discomfort   Hold (Positioning) 1-->minimal assist, teach one side: mother does other, staff holds   Score (less than 7 for 2/more consecutive times, consult Lactation Consultant) 6   Maternal Infant Feeding   Signs of Milk Transfer audible swallow;infant jaw motion present;suck/swallow ratio   Presence of Pain yes   Pain Location nipples, bilateral   Pain Description sharp   Nipple Shape After Feeding, Left wdl   Nipple Shape After Feeding, Right wdl   Latch Assistance yes   Breastfeeding Education adequate infant intake;adequate milk volume;importance of skin-to-skin contact;milk expression, hand   Feeding Infant   Effective Latch During Feeding yes   Audible Swallow yes   Suck/Swallow Coordination present   Lactation Referrals   Lactation Consult Breastfeeding assessment;Initial assessment;Breast/nipple pain   Lactation Interventions   Attachment Promotion skin-to-skin contact encouraged;rooming-in promoted;role responsibility promoted;family involvement promoted;infant-mother separation minimized;privacy provided;face-to-face positioning promoted;environment adjusted;counseling provided;breastfeeding assistance provided   Breast Care: Breastfeeding milk massaged towards nipple   Breastfeeding Assistance support offered;both breasts offered each feeding;assisted with positioning;feeding on demand promoted;feeding cue recognition promoted;feeding session observed;infant latch-on verified;infant suck/swallow verified;milk expression/pumping;infant stimulated to wakeful state   Maternal Breastfeeding Support diary/feeding log utilized;encouragement offered;infant-mother separation minimized;lactation counseling provided;maternal hydration promoted;maternal nutrition promoted;maternal rest encouraged   Latch Promotion suck stimulated with  colostrum drop;infant moved to breast;positioning assisted

## 2018-09-18 NOTE — PROGRESS NOTES
Notified OLINDA Avalos of pt unrelieved itching after given vistaril and benadryl. New order received placed via OLINDA Avalos.

## 2018-09-18 NOTE — PLAN OF CARE
Problem: Patient Care Overview  Goal: Plan of Care Review  Outcome: Ongoing (interventions implemented as appropriate)  Patient resting in bed after receiving epidural. Vital signs stable. Plan of care reviewed with patient, spouse and . Patient denies questions at this time.

## 2018-09-18 NOTE — ANESTHESIA RELEASE NOTE
Anesthesia Release from PACU Note    Patient: Sheree Melgar    Procedure(s) Performed: * No procedures listed *    Anesthesia type: epidural    Post pain: Adequate analgesia    Post assessment: no apparent anesthetic complications, tolerated procedure well and no evidence of recall    Last Vitals:   Visit Vitals  BP (!) 106/55   Pulse 97   Temp 36.9 °C (98.4 °F) (Oral)   Resp 18   LMP 11/22/2017   Breastfeeding? No       Post vital signs: stable    Level of consciousness: awake    Nausea/Vomiting: no nausea/no vomiting    Complications: none    Airway Patency: patent    Respiratory: unassisted    Cardiovascular: stable and blood pressure at baseline    Hydration: euvolemic

## 2018-09-18 NOTE — PROGRESS NOTES
Notified Dr. Dupree of pt complaining of pain 8/10 and not relieved by scheduled Motrin 600 mg and PRN Norco. Pt states she would rather not take Norco due to severe itching. New orders for Torodol 30 mg q6h scheduled and discontinue Norco. New order placed for Percocet q4h PRN.

## 2018-09-18 NOTE — TRANSFER OF CARE
Anesthesia Transfer of Care Note    Patient: Sheree Melgar    Procedure(s) Performed: * No procedures listed *    Patient location: Labor and Delivery    Anesthesia Type: epidural    Transport from OR: Transported from OR on room air with adequate spontaneous ventilation    Post pain: adequate analgesia    Post assessment: no apparent anesthetic complications    Post vital signs: stable    Level of consciousness: awake, alert and oriented    Nausea/Vomiting: no nausea/vomiting    Complications: none    Transfer of care protocol was followed      Last vitals:   Visit Vitals  BP (!) 106/55   Pulse 97   Temp 36.9 °C (98.4 °F) (Oral)   Resp 18   LMP 11/22/2017   Breastfeeding? No

## 2018-09-18 NOTE — L&D DELIVERY NOTE
[facility]   Section   Operative Note    SUMMARY     Date of Procedure:        OPERATIVE NOTE    2018     PRE-PROCEDURE COUNSELING  Patient counseled on the risks, benefits, and alternatives to procedure.  Please see preoperative consents.   SCDs were applied and working prior to anesthesia induction.    Pre-operative Diagnosis:  Spontaneous rutpure of membranes, previous uterine incision previous  delivery, failed trial of labor: no dilation beyond 6cm or descent beyond -3 station    Post-operative Diagnosis: same    PROCEDURE: repeat low transverse  section    Surgeon: Karin Dupree MD    Assistants: DEIDRE Redd     Anesthesia: epidural anesthesia     Procedure Details   The patient was seen in the Holding Room. The risks, benefits and alternatives were discussed. The patient concurred with the proposed plan, giving informed consent.  The patient was taken to Operating Room, identified as Sheree Melgar    and the procedure verified as  Delivery. A Time Out was held and the above information confirmed.    After induction of anesthesia, the patient was draped and prepped in the usual sterile manner. A Pfannenstiel incision was made and carried down through the subcutaneous tissue to the fascia. Fascial incision was made and extended transversely. The fascia was  from the underlying rectus tissue superiorly and inferiorly. The peritoneum was identified and entered bluntly then extended superiorly and inferiorly with good visualization of the underlying bowel and bladder.   The utero-vesical peritoneal reflection was incised transversely and the bladder flap was bluntly freed from the lower uterine segment. A low transverse uterine incision was made. There was clear amniotic fluid noted. The baby girl Carina 9'11 was delivered from vertex presentation,   and with Apgar scores of 9/9 . The umbilical cord was clamped and cut, and cord  blood was obtained. The placenta was removed intact and appeared normal .  The uterine incision was reapproximated with running locked sutures of 0 Chromic.     Lavage was performed and hemostasis noted.. The fascia was then reapproximated with running sutures of 0 loop PDS. The skin was reapproximated with 4-0 vicryl in a subcuticular fashion.  Aquacel bandage was placed over the incision.  Instrument, sponge, and needle counts were correct prior the abdominal closure and at the conclusion of the case.     Estimated Blood Loss:  200mL           Specimens: none            Complications:  none           Disposition: to recovery PP room           Condition: stable              Delivery Information for  Girl Sheree Melgar    Birth information:  YOB: 2019   Time of birth: 8:43 PM   Sex: female   Head Delivery Date/Time: 2018  8:43 PM   Delivery type: , Low Transverse   Gestational Age: 41w4d    Delivery Providers    Delivering clinician:  Karin Dupree MD   Provider Role    Jess Coronado RN Registered Nurse    Kaitlin Parker RN Registered Nurse    Sulema Allred RN Registered Nurse    Edna Grover, CRNA Nurse Anesthetist    Blanca Blackwood Surgical Tech    [facility] Surgical Tech    Magalis Díaz MD Anesthesiologist            Measurements    Weight:  4390 g  Length:  54 cm  Head circumference:  37 cm  Chest circumference:  36.5 cm  Abdominal girth:  35 cm         Apgars    Living status:  Living  Apgars:   1 min.:   5 min.:   10 min.:   15 min.:   20 min.:     Skin color:          Heart rate:          Reflex irritability:          Muscle tone:          Respiratory effort:          Total:                 Operative Delivery    Forceps attempted?:  No  Vacuum extractor attempted?:  No         Shoulder Dystocia    Shoulder dystocia present?:  No           Presentation    Presentation:  Vertex  Position:  Middle           Interventions/Resuscitation    Method:  Bulb  Suctioning, Tactile Stimulation       Cord    Vessels:  3 vessels  Complications:  None  Delayed Cord Clamping?:  Yes  Cord Clamped Date/Time:  2018  8:44 PM  Cord Blood Disposition:  Lab  Gases Sent?:  No  Stem Cell Collection (by MD):  No       Placenta    Placenta delivery date/time:  2018  Placenta removal:  Manual removal  Placenta appearance:  Intact  Placenta disposition:  discarded           Labor Events:       labor: No     Labor Onset Date/Time:         Dilation Complete Date/Time:         Start Pushing Date/Time:       Rupture Date/Time:              Rupture type:           Fluid Amount:        Fluid Color:        Fluid Odor:        Membrane Status (PeriCalm):        Rupture Date/Time (PeriCalm):        Fluid Amount (PeriCalm):        Fluid Color (PeriCalm):         steroids: None     Antibiotics given for GBS: No     Induction: none     Indications for induction:        Augmentation:       Indications for augmentation:       Labor complications: Failure to Progress in Second Stage     Additional complications:          Cervical ripening:                     Delivery:      Episiotomy:       Indication for Episiotomy:       Perineal Lacerations:   Repaired:      Periurethral Laceration:   Repaired:     Labial Laceration:   Repaired:     Sulcus Laceration:   Repaired:     Vaginal Laceration:   Repaired:     Cervical Laceration:   Repaired:     Repair suture:       Repair # of packets:       Vaginal delivery QBL (mL): 0      QBL (mL): 0     Combined Blood Loss (mL): 0     Vaginal Sweep Performed:       Surgicount Correct:         Other providers:       Anesthesia    Method:  Epidural          Details (if applicable):  Trial of Labor Yes    Categorization: Repeat    Priority: Routine   Indications for : Failure to Progress;Failed    Incision Type: low transverse     Additional  information:  Forceps:    Vacuum:    Breech:     Observed anomalies    Other (Comments):

## 2018-09-18 NOTE — ANESTHESIA POSTPROCEDURE EVALUATION
Anesthesia Post Evaluation    Patient: Sheree Melgar    Procedure(s) Performed: * No procedures listed *    Final Anesthesia Type: epidural  Patient location during evaluation: labor & delivery  Patient participation: Yes- Able to Participate  Level of consciousness: awake and alert  Post-procedure vital signs: reviewed and stable  Pain management: adequate  Airway patency: patent  PONV status at discharge: No PONV  Anesthetic complications: no      Cardiovascular status: blood pressure returned to baseline  Respiratory status: unassisted  Hydration status: euvolemic  Follow-up not needed.        Visit Vitals  BP (!) 106/55   Pulse 97   Temp 36.9 °C (98.4 °F) (Oral)   Resp 18   LMP 11/22/2017   Breastfeeding? No       Pain/Sylvie Score: Pain Rating Prior to Med Admin: 10 (9/17/2018  3:55 PM)  Pain Rating Post Med Admin: 10 (9/17/2018  5:00 PM)

## 2018-09-18 NOTE — ASSESSMENT & PLAN NOTE
2018   Pre-operative Diagnosis:  Spontaneous rutpure of membranes, previous uterine incision previous  delivery, failed trial of labor: no dilation beyond 6cm or descent beyond -3 station  Post-operative Diagnosis: same  PROCEDURE: repeat low transverse  section  EBL 200mL, no complications    18  POD#1 s/p repeat LTCS after failed TOLAC.  Patient is up out of bed this am, but caceres still in place.  Will have nurse remove this am.  She is not passing flatus as of yet.  Pain control adequate.  No nausea/vomiting with ice chips.  Advance diet.  Encourage ambulation  Vistaril for itching- benadryl ineffective

## 2018-09-18 NOTE — SUBJECTIVE & OBJECTIVE
Hospital course: Admit, continuous efm   0800 active labor, beth well. Reviewed birth plan.   1200 Coping well, feeling intermittently pushy with contractions  1550 Pt not coping well, tearful, unsure if she can continue. Requesting SVE    1930 there is poor progression in labor - cervix is about 4cm now, due to edema. Vertex at -3. Discussed this w pt and family. Mary Carmen debi was present. Reviewed r/a/b of continuing the trial of labor versus  delivery. Recommend . Pt understands, q's answered. Desires to proceed w c/s. discusssed r/a/b and consents signed.       2018   Pre-operative Diagnosis:  Spontaneous rutpure of membranes, previous uterine incision previous  delivery, failed trial of labor: no dilation beyond 6cm or descent beyond -3 station  Post-operative Diagnosis: same  PROCEDURE: repeat low transverse  section  EBL 200mL, no complications      Interval History:     She is doing well this morning. She is tolerating fluids and ice chips without nausea or vomiting. She is not voiding spontaneously- caceres to be removed this morning. She is ambulating in room. She has not passed flatus, and has not a BM. Vaginal bleeding is mild. She denies fever or chills. Abdominal pain is moderate and controlled with oral medications. She is breastfeeding. She desires circumcision for her male baby: not applicable.    Objective:     Vital Signs (Most Recent):  Temp: 98.7 °F (37.1 °C) (18 0400)  Pulse: 95 (18 0400)  Resp: 20 (18 0400)  BP: (!) 112/57 (18 0400)  SpO2: 100 % (18 2150) Vital Signs (24h Range):  Temp:  [98.1 °F (36.7 °C)-98.7 °F (37.1 °C)] 98.7 °F (37.1 °C)  Pulse:  [] 95  Resp:  [18-20] 20  SpO2:  [100 %] 100 %  BP: ()/(41-86) 112/57        There is no height or weight on file to calculate BMI.      Intake/Output Summary (Last 24 hours) at 2018 0743  Last data filed at 2018 0126  Gross per 24 hour   Intake 2000 ml    Output 1750 ml   Net 250 ml       Significant Labs:  Lab Results   Component Value Date    GROUPTRH A POS 09/17/2018    HEPBSAG Negative 01/19/2018    STREPBCULT No Group B Streptococcus isolated 08/14/2018     Recent Labs   Lab  09/17/18   0530   HGB  9.9*   HCT  31.4*       I have personallly reviewed all pertinent lab results from the last 24 hours.    Physical Exam:   Constitutional: She is oriented to person, place, and time. She appears well-developed and well-nourished.    HENT:   Head: Normocephalic.     Neck: Normal range of motion. No thyromegaly present.    Cardiovascular: Normal rate and regular rhythm.     Pulmonary/Chest: Effort normal and breath sounds normal.        Abdominal: Soft. Bowel sounds are normal. She exhibits abdominal incision (aquasel dressing clean dry & intact).     Genitourinary:   Genitourinary Comments: Fundus nontender near the umbilicus             Musculoskeletal: Normal range of motion and moves all extremeties.       Neurological: She is alert and oriented to person, place, and time.    Skin: Skin is warm.    Psychiatric: She has a normal mood and affect.

## 2018-09-18 NOTE — PROGRESS NOTES
[facility] -   Obstetrics  Postpartum Progress Note    Patient Name: Sheree Melgar  MRN: [id number]  Admission Date: 2018  Hospital Length of Stay: 1 days  Attending Physician: Mae Chen, *  Primary Care Provider: Primary Doctor No    Subjective:     Principal Problem:S/P  section    Hospital course: Admit, continuous efm   0800 active labor, beth well. Reviewed birth plan.   1200 Coping well, feeling intermittently pushy with contractions  1550 Pt not coping well, tearful, unsure if she can continue. Requesting SVE    1930 there is poor progression in labor - cervix is about 4cm now, due to edema. Vertex at -3. Discussed this w pt and family. Mary Carmen carrera was present. Reviewed r/a/b of continuing the trial of labor versus  delivery. Recommend . Pt understands, q's answered. Desires to proceed w c/s. discusssed r/a/b and consents signed.       2018   Pre-operative Diagnosis:  Spontaneous rutpure of membranes, previous uterine incision previous  delivery, failed trial of labor: no dilation beyond 6cm or descent beyond -3 station  Post-operative Diagnosis: same  PROCEDURE: repeat low transverse  section  EBL 200mL, no complications      Interval History:     She is doing well this morning. She is tolerating fluids and ice chips without nausea or vomiting. She is not voiding spontaneously- caceres to be removed this morning. She is ambulating in room. She has not passed flatus, and has not a BM. Vaginal bleeding is mild. She denies fever or chills. Abdominal pain is moderate and controlled with oral medications. She is breastfeeding. She desires circumcision for her male baby: not applicable.    Objective:     Vital Signs (Most Recent):  Temp: 98.7 °F (37.1 °C) (18 0400)  Pulse: 95 (18 0400)  Resp: 20 (18)  BP: (!) 112/57 (18 0400)  SpO2: 100 % (180) Vital Signs (24h Range):  Temp:  [98.1 °F  (36.7 °C)-98.7 °F (37.1 °C)] 98.7 °F (37.1 °C)  Pulse:  [] 95  Resp:  [18-20] 20  SpO2:  [100 %] 100 %  BP: ()/(41-86) 112/57        There is no height or weight on file to calculate BMI.      Intake/Output Summary (Last 24 hours) at 2018 0743  Last data filed at 2018 0126  Gross per 24 hour   Intake 2000 ml   Output 1750 ml   Net 250 ml       Significant Labs:  Lab Results   Component Value Date    GROUPTRH A POS 2018    HEPBSAG Negative 2018    STREPBCULT No Group B Streptococcus isolated 2018     Recent Labs   Lab  18   0530   HGB  9.9*   HCT  31.4*       I have personallly reviewed all pertinent lab results from the last 24 hours.    Physical Exam:   Constitutional: She is oriented to person, place, and time. She appears well-developed and well-nourished.    HENT:   Head: Normocephalic.     Neck: Normal range of motion. No thyromegaly present.    Cardiovascular: Normal rate and regular rhythm.     Pulmonary/Chest: Effort normal and breath sounds normal.        Abdominal: Soft. Bowel sounds are normal. She exhibits abdominal incision (aquasel dressing clean dry & intact).     Genitourinary:   Genitourinary Comments: Fundus nontender near the umbilicus             Musculoskeletal: Normal range of motion and moves all extremeties.       Neurological: She is alert and oriented to person, place, and time.    Skin: Skin is warm.    Psychiatric: She has a normal mood and affect.       Assessment/Plan:     28 y.o. female  for:    * S/P  section    2018   Pre-operative Diagnosis:  Spontaneous rutpure of membranes, previous uterine incision previous  delivery, failed trial of labor: no dilation beyond 6cm or descent beyond -3 station  Post-operative Diagnosis: same  PROCEDURE: repeat low transverse  section  EBL 200mL, no complications    18  POD#1 s/p repeat LTCS after failed TOLAC.  Patient is up out of bed this am, but caceres still  in place.  Will have nurse remove this am.  She is not passing flatus as of yet.  Pain control adequate.  No nausea/vomiting with ice chips.  Advance diet.  Encourage ambulation  Vistaril for itching- benadryl ineffective               Disposition: As patient meets milestones, will plan to discharge within 24-48 hours.    Mitzy Masterson PA-C  Obstetrics  [facility]

## 2018-09-19 VITALS
RESPIRATION RATE: 18 BRPM | OXYGEN SATURATION: 100 % | DIASTOLIC BLOOD PRESSURE: 69 MMHG | TEMPERATURE: 98 F | SYSTOLIC BLOOD PRESSURE: 123 MMHG | HEART RATE: 112 BPM

## 2018-09-19 PROCEDURE — 25000003 PHARM REV CODE 250: Performed by: OBSTETRICS & GYNECOLOGY

## 2018-09-19 PROCEDURE — 25000003 PHARM REV CODE 250: Performed by: PHYSICIAN ASSISTANT

## 2018-09-19 RX ORDER — IBUPROFEN 800 MG/1
800 TABLET ORAL EVERY 8 HOURS
Qty: 60 TABLET | Refills: 0 | Status: SHIPPED | OUTPATIENT
Start: 2018-09-19 | End: 2022-08-16

## 2018-09-19 RX ORDER — OXYCODONE AND ACETAMINOPHEN 5; 325 MG/1; MG/1
1 TABLET ORAL EVERY 6 HOURS PRN
Qty: 30 TABLET | Refills: 0 | Status: SHIPPED | OUTPATIENT
Start: 2018-09-19 | End: 2022-08-16

## 2018-09-19 RX ORDER — HYDROXYZINE PAMOATE 50 MG/1
50 CAPSULE ORAL EVERY 6 HOURS PRN
Qty: 60 CAPSULE | Refills: 0 | Status: SHIPPED | OUTPATIENT
Start: 2018-09-19 | End: 2018-10-15 | Stop reason: SDUPTHER

## 2018-09-19 RX ORDER — HYDROXYZINE PAMOATE 50 MG/1
50 CAPSULE ORAL EVERY 6 HOURS PRN
Status: DISCONTINUED | OUTPATIENT
Start: 2018-09-19 | End: 2018-09-19 | Stop reason: HOSPADM

## 2018-09-19 RX ADMIN — IBUPROFEN 800 MG: 800 TABLET ORAL at 05:09

## 2018-09-19 RX ADMIN — CHLORHEXIDINE GLUCONATE 10 ML: 1.2 RINSE ORAL at 07:09

## 2018-09-19 RX ADMIN — OXYCODONE HYDROCHLORIDE AND ACETAMINOPHEN 1 TABLET: 5; 325 TABLET ORAL at 07:09

## 2018-09-19 RX ADMIN — DOCUSATE SODIUM AND SENNOSIDES 1 TABLET: 8.6; 5 TABLET, FILM COATED ORAL at 07:09

## 2018-09-19 RX ADMIN — HYDROXYZINE PAMOATE 50 MG: 50 CAPSULE ORAL at 07:09

## 2018-09-19 NOTE — DISCHARGE INSTRUCTIONS
Mother Self Care:    Activity: Avoid strenuous exercise and get adequate rest.  No driving until your physician gives you consent.  Emotional Changes: The grieving process has many different stages, be prepared to experience lots of emotional ups and downs. Identify people to be your support system, and do not hesitate to call our  if you need someone to talk to.   Breast Care: You may notice milk leaking from your breasts. Wear a support bra 24 hours a day for one week or wrap breasts in an ace bandage if needed to stop milk production.  Avoid stimulation to breasts.  You may use ice packs for discomfort.  Ward-Care/Vaginal Bleeding: Remember to use your ward-bottle after urinating.  Your flow will change from red, to pink, to yellow/white color over a period of 2 weeks.  Menstruation will return in 3-8 weeks.  Episiotomy Vaginal Delivery: Stitches will dissolve within 10 days to 3 weeks.  Warm baths, tucks, and dermoplast spray will promote healing.  Avoid bubble baths or strong soaps.   Section/Tubal Ligation: Keep incision clean and dry.  Please remove steri-strips in 5-7 days.  You may shower, but avoid baths.  Sexual Activity/Pelvic Rest: No sexual activity, tampons, or douching until your physician gives you consent.  Diet: Continue to eat from the five basic food groups, including plenty of protein, fruits, vegetables, and whole grains.  Limit empty calories and high fat foods.  Drink enough fluids to satisfy thirst.  Constipation/Hemorrhoids: Drink plenty of water.  You may take a stool softener or natural laxative (Metamucil). You may use tucks or hemorrhoid ointment and soak in a warm tub.    CALL YOUR OB DOCTOR IF ANY OF THE FOLLOWING OCCURS:  *Heavy bleeding - saturating a pad an hour or passing any large (2-3 inches in size) blood clots.  *Any pain, redness, or tenderness in lower leg.  *You cannot care for yourself  *Any signs of infection-      - Temperature greater than 100.5  degrees F      - Foul smelling vaginal discharge and/or incisional drainage      - Increased episiotomy or incisional pain      - Hot, hard, red or sore area on breast      - Flu-like symptoms      - Any urgency, frequency or burning with urination

## 2018-09-19 NOTE — DISCHARGE SUMMARY
[facility] -   Obstetrics  Discharge Summary      Patient Name: Sheree Melgar  MRN: [id number]  Admission Date: 2018  Hospital Length of Stay: 2 days  Discharge Date and Time:  2018 9:24 AM  Attending Physician: Mae Chen, *   Surgeon: Karin Dupree MD   Discharging Provider: Mitzy Masterson PA-C    HPI: srom at 300am ctx Q 3-6  Desired       Procedure(s) (LRB):   SECTION (N/A)     Hospital Course:   Admit, continuous efm   0800 active labor, beth well. Reviewed birth plan.   1200 Coping well, feeling intermittently pushy with contractions  1550 Pt not coping well, tearful, unsure if she can continue. Requesting SVE    1930 there is poor progression in labor - cervix is about 4cm now, due to edema. Vertex at -3. Discussed this w pt and family. Mary Carmen carrera was present. Reviewed r/a/b of continuing the trial of labor versus  delivery. Recommend . Pt understands, q's answered. Desires to proceed w c/s. discusssed r/a/b and consents signed.       2018   Pre-operative Diagnosis:  Spontaneous rutpure of membranes, previous uterine incision previous  delivery, failed trial of labor: no dilation beyond 6cm or descent beyond -3 station  Post-operative Diagnosis: same  PROCEDURE: repeat low transverse  section  EBL 200mL, no complications    2018  POD#1 s/p repeat LTCS after failed TOLAC.  Patient is up out of bed this am, but caceres still in place.  Will have nurse remove this am.  She is not passing flatus as of yet.  Pain control adequate.  No nausea/vomiting with ice chips.  Advance diet.  Encourage ambulation  Vistaril for itching- benadryl ineffective      2018  Patient has met all goals for discharge.  Her pain control is adequate with current ibuprofen & percocet regimen- will include Rx for Vistaril prn to take with pain meds to ease the itching.  Patient says itching is not as bad this morning,  however she did not take pain medicine last night to prevent any itching from keeping her awake.      Final Active Diagnoses:    Diagnosis Date Noted POA    PRINCIPAL PROBLEM:  S/P  section [Z98.891] 2018 Not Applicable    Failed trial labor [O66.40] 2018 No      Problems Resolved During this Admission:    Diagnosis Date Noted Date Resolved POA    Amniotic fluid leaking [O42.90] 2018 Yes        Labs: All labs within the past 24 hours have been reviewed    Feeding Method: breast    Immunizations     Date Immunization Status Dose Route/Site Given by    18 MMR Incomplete 0.5 mL Subcutaneous/Left deltoid     18 Tdap Incomplete 0.5 mL Intramuscular/Left deltoid           Delivery:    Episiotomy:     Lacerations:     Repair suture:     Repair # of packets:     Blood loss (ml): 0     Birth information:  YOB: 2019   Time of birth: 8:43 PM   Sex: female   Delivery type: , Low Transverse   Gestational Age: 41w4d    Delivery Clinician:      Other providers:       Additional  information:  Forceps:    Vacuum:    Breech:    Observed anomalies      Living?:           APGARS  One minute Five minutes Ten minutes   Skin color:         Heart rate:         Grimace:         Muscle tone:         Breathing:         Totals: 9  9        Placenta: Delivered:       appearance    Pending Diagnostic Studies:     None          Discharged Condition: good    Disposition: Home or Self Care    Follow Up:  Follow-up Information     OB GYN NURSE, [facility] On 2018.    Why:  Bandage removal appt at 11:00 am           Karin Dupree MD On 10/15/2018.    Specialties:  Gynecology, Obstetrics and Gynecology, Obstetrics  Why:  Post op visit, 1:00 pm appt at [facility]   Contact information:  [address]  [place] [address]  [phone]                 Patient Instructions:      Call MD for:  temperature >100.4     Call MD for:  severe uncontrolled pain      Call MD for:  redness, tenderness, or signs of infection (pain, swelling, redness, odor or green/yellow discharge around incision site)     Call MD for:  severe persistent headache     Call MD for:  persistent dizziness, light-headedness, or visual disturbances     Leave dressing on - Keep it clean, dry, and intact until clinic visit   Order Comments: Showers only- no baths.     Medications:  Current Discharge Medication List      START taking these medications    Details   hydrOXYzine pamoate (VISTARIL) 50 MG Cap Take 1 capsule (50 mg total) by mouth every 6 (six) hours as needed.  Qty: 60 capsule, Refills: 0      ibuprofen (ADVIL,MOTRIN) 800 MG tablet Take 1 tablet (800 mg total) by mouth every 8 (eight) hours.  Qty: 60 tablet, Refills: 0      oxyCODONE-acetaminophen (PERCOCET) 5-325 mg per tablet Take 1 tablet by mouth every 6 (six) hours as needed.  Qty: 30 tablet, Refills: 0         CONTINUE these medications which have NOT CHANGED    Details   evening primrose oil (EVENING PRIMROSE ORAL)       prenatal vit,dayanara 74/iron/folic (PRENATAL VITAMIN 1+1 ORAL) Take by mouth.             Mitzy Masterson PA-C  Obstetrics  [facility]

## 2018-09-19 NOTE — LACTATION NOTE
Lactation rounds  Infant weight loss and output is WDL. Mother has a compression line scab on her left nipple: reviewed nipple care. Attempted to feed baby in a cross cradle hold on the right breast- baby is licking the breast, but isnt hungry at the moment.   Lactation discharge information reviewed.  Mother is aware of warm line, and outpatient consultations and monthly support gatherings. Encouraged mother to contact lactation with any questions, concerns, or problems. Contact numbers provided, and mother verbalizes understanding.    Noted a small facial asymmetry- lips are pulling more towards the right, but good spontaneous motions on the left. I Shared findings with mother: she will monitor asymmetry and consult with pediatrician should asymmetry doesn't resolve in a few days.

## 2018-09-19 NOTE — PLAN OF CARE
VSS.Viable pt. Fundus below umbilicus. Lochia rubra and light. Pain well controlled with medication. Safety maintained. Pt educated on discharge care for self and infant. Verbal understanding of discharge care received from pt. Pt discharged via wheel chair with  and infant.

## 2018-09-24 ENCOUNTER — CLINICAL SUPPORT (OUTPATIENT)
Dept: OBSTETRICS AND GYNECOLOGY | Facility: CLINIC | Age: 28
End: 2018-09-24
Payer: COMMERCIAL

## 2018-09-24 DIAGNOSIS — Z48.01 DRESSING CHANGE OR REMOVAL, SURGICAL WOUND: Primary | ICD-10-CM

## 2018-09-28 ENCOUNTER — TELEPHONE (OUTPATIENT)
Dept: OBSTETRICS AND GYNECOLOGY | Facility: CLINIC | Age: 28
End: 2018-09-28

## 2018-09-28 NOTE — TELEPHONE ENCOUNTER
León Hill with [facility] was calling to get her delivery date and the type,  Date: 09/17/2018 and was by C/S. Tf

## 2018-09-29 ENCOUNTER — TELEPHONE (OUTPATIENT)
Dept: LACTATION | Facility: CLINIC | Age: 28
End: 2018-09-29

## 2018-10-02 ENCOUNTER — PATIENT MESSAGE (OUTPATIENT)
Dept: OBSTETRICS AND GYNECOLOGY | Facility: CLINIC | Age: 28
End: 2018-10-02

## 2018-10-15 ENCOUNTER — OFFICE VISIT (OUTPATIENT)
Dept: OBSTETRICS AND GYNECOLOGY | Facility: CLINIC | Age: 28
End: 2018-10-15
Payer: COMMERCIAL

## 2018-10-15 VITALS
BODY MASS INDEX: 26.65 KG/M2 | DIASTOLIC BLOOD PRESSURE: 70 MMHG | SYSTOLIC BLOOD PRESSURE: 112 MMHG | WEIGHT: 141.13 LBS | HEIGHT: 61 IN

## 2018-10-15 DIAGNOSIS — Z98.891 STATUS POST C-SECTION: Primary | ICD-10-CM

## 2018-10-15 PROCEDURE — 0503F POSTPARTUM CARE VISIT: CPT | Mod: S$GLB,,, | Performed by: OBSTETRICS & GYNECOLOGY

## 2018-10-15 PROCEDURE — 99999 PR PBB SHADOW E&M-EST. PATIENT-LVL II: CPT | Mod: PBBFAC,,, | Performed by: OBSTETRICS & GYNECOLOGY

## 2018-10-15 RX ORDER — DOCUSATE SODIUM 100 MG/1
100 CAPSULE, LIQUID FILLED ORAL 2 TIMES DAILY
COMMUNITY
End: 2022-08-16

## 2018-10-15 RX ORDER — HYDROXYZINE PAMOATE 50 MG/1
50 CAPSULE ORAL EVERY 8 HOURS PRN
Qty: 60 CAPSULE | Refills: 0 | Status: SHIPPED | OUTPATIENT
Start: 2018-10-15 | End: 2022-08-16

## 2018-10-15 RX ORDER — ACETAMINOPHEN AND CODEINE PHOSPHATE 120; 12 MG/5ML; MG/5ML
1 SOLUTION ORAL DAILY
Qty: 28 TABLET | Refills: 11 | Status: SHIPPED | OUTPATIENT
Start: 2018-10-15 | End: 2021-09-20 | Stop reason: SDUPTHER

## 2018-10-15 NOTE — PROGRESS NOTES
CC: Post-partum follow-up    Sheree Melgar is a 28 y.o. female   who presents for post-partum visit 4 weeks s/p repeat LTCS.    She and the baby are doing well.  No pain.  No fever.   No bowel / bladder complaints.    Delivery note reviewed:  Repeat c/s 18 with Dr. Dupree     Baby girl,Carina, 9lbs 10.9oz.     Breast feeding: yes    Birth control: She is not yet sexually active.  Contraception: OCPs  Depression: no   Does have history of anxiety- took ativan prn before pregnancy.  Taking vistaril 50mg q8hr prn while breastfeeding.  Good family support: yes- however,  does work offshore    Prenatal Record:  Gestational diabetes: none    Recent Hct: 31.1    Pap: normal within 3 years- Last 16  GYN screening history: last Pap: was normal.  History of HPV+ pap smear 6 years ago.  She has no ocrisks.      Health Maintenance   Topic Date Due    Lipid Panel  1989    Influenza Vaccine  2018    Pap Smear  2019    TETANUS VACCINE  2028       HISTORY  Patient Active Problem List   Diagnosis    S/P  section    Failed trial labor       Past Medical History:   Diagnosis Date    Abnormal Pap smear     LGSIL 3/13    Abnormal Pap smear of cervix     Abnormal Pap smear of vagina     History of  delivery, currently pregnant 2018    Documented LTCS in legacy documents at [facility] for failure to descend, fetal tachycardia, and maternal febrile morbidity.  Pt interested in .       Past Surgical History:   Procedure Laterality Date     SECTION  3/16/12     SECTION N/A 2018    Procedure:  SECTION;  Surgeon: Karin Dupree MD;  Location: [facility] L&D;  Service: OB/GYN;  Laterality: N/A;     SECTION N/A 2018    Performed by Karin Dupree MD at [facility] L&D    COLPOSCOPY         Family History   Problem Relation Age of Onset    Breast cancer Neg Hx     Colon cancer Neg Hx     Ovarian cancer Neg Hx         Social History     Socioeconomic History    Marital status: Single     Spouse name: None    Number of children: None    Years of education: None    Highest education level: None   Social Needs    Financial resource strain: None    Food insecurity - worry: None    Food insecurity - inability: None    Transportation needs - medical: None    Transportation needs - non-medical: None   Occupational History    None   Tobacco Use    Smoking status: Never Smoker    Smokeless tobacco: Never Used   Substance and Sexual Activity    Alcohol use: Yes     Comment: socially    Drug use: No    Sexual activity: Yes     Partners: Male     Birth control/protection: None   Other Topics Concern    None   Social History Narrative    None       Current Outpatient Medications   Medication Sig Dispense Refill    docusate sodium (COLACE) 100 MG capsule Take 100 mg by mouth 2 (two) times daily.      evening primrose oil (EVENING PRIMROSE ORAL)       hydrOXYzine pamoate (VISTARIL) 50 MG Cap Take 1 capsule (50 mg total) by mouth every 8 (eight) hours as needed. 60 capsule 0    ibuprofen (ADVIL,MOTRIN) 800 MG tablet Take 1 tablet (800 mg total) by mouth every 8 (eight) hours. 60 tablet 0    oxyCODONE-acetaminophen (PERCOCET) 5-325 mg per tablet Take 1 tablet by mouth every 6 (six) hours as needed. 30 tablet 0    prenatal vit,dayanara 74/iron/folic (PRENATAL VITAMIN 1+1 ORAL) Take by mouth.      norethindrone (ORTHO MICRONOR) 0.35 mg tablet Take 1 tablet (0.35 mg total) by mouth once daily. 28 tablet 11     No current facility-administered medications for this visit.        Review of patient's allergies indicates:  No Known Allergies        PHYSICAL EXAM     Vitals:    10/15/18 1333   BP: 112/70       PAIN SCALE: 0/10 None    ROS:  GENERAL: No fever, chills, fatigability or weight loss.  ABDOMEN: Appetite fine. No weight loss. Denies diarrhea, abdominal pain, hematemesis or blood in stool.  No change in bowel movement  pattern.  URINARY: No flank pain, dysuria or hematuria.  REPRODUCTIVE: No abnormal vaginal bleeding.  BREASTS: Breasts symmetric, nontender and no lumps detected.    PE:   APPEARANCE: Well nourished, well developed, in no acute distress.  NECK: Neck symmetric without masses or thyromegaly.  NODES: No inguinal lymph node enlargement.  ABDOMEN: Soft. No tenderness or masses. No hepatosplenomegaly. No hernias.   C/S incision C/D/I.    BREASTS: Symmetrical, no skin changes or visible lesions. No palpable masses, nipple discharge or adenopathy bilaterally.    PELVIC:   VULVA: No lesions. Normal female genitalia.  URETHRAL MEATUS: Normal size and location, no lesions, no prolapse.  URETHRA: No masses, tenderness, prolapse or scarring.  VAGINA: Moist and well rugated, no discharge, no significant cystocele or rectocele.  CERVIX: No lesions, normal diameter, no stenosis, no cervical motion tenderness.  UTERUS: 8-10 week size, regular shape, mobile, non-tender, normal position, good support.  ADNEXA: No masses, tenderness or CDS nodularity.  ANUS PERINEUM: No lesions, no relaxation, no external hemorrhoids.  RECTUM: no masses    DIAGNOSIS:   1. Normal Postpartum exam  2. Contraception counseling- Micronor prescribed to continue while breastfeeding   - Patient would like Nuvaring once she is no longer breastfeeding.        PLAN:   May resume normal activities       COUNSELING:  Patient was counseled today on A.C.S. Pap guidelines and recommendations for yearly pelvic exams, mammograms and monthly self breast exams; to see her PCP for other health maintenance.     Reviewed all forms of contraception with patient including pills, patch, ring, Depo Provera, Nexplanon, Paraguard, and Mirena. The use of contraceptive options has been fully discussed with the patient. This includes the proper method to initiate and continue the pills, the need for regular compliance to ensure adequate contraceptive effect, the physiology which make  the contraception effective, the instructions for what to do in event of a missed pill/ring/strings, and warnings about anticipated minor side effects such as breakthrough spotting, nausea, breast tenderness, weight changes, acne, headaches, etc.  She has been told of the more serious potential side effects such as MI, stroke, and deep vein thrombosis.  She has been asked to report any signs of such serious problems immediately.  She should back with a condom during the first month of use of any new contraception method. The need for additional protection, such as a condom, to prevent exposure to sexually transmitted diseases has also been discussed. She understands and wishes to take the congtraception as prescribed.      FOLLOW-UP: With Dr. Dupree in 1 year or as needed.  Repeat pap smear at this time.

## 2018-11-26 ENCOUNTER — PATIENT MESSAGE (OUTPATIENT)
Dept: OBSTETRICS AND GYNECOLOGY | Facility: CLINIC | Age: 28
End: 2018-11-26

## 2018-12-21 ENCOUNTER — PATIENT MESSAGE (OUTPATIENT)
Dept: OBSTETRICS AND GYNECOLOGY | Facility: CLINIC | Age: 28
End: 2018-12-21

## 2018-12-23 RX ORDER — SERTRALINE HYDROCHLORIDE 25 MG/1
25 TABLET, FILM COATED ORAL DAILY
Qty: 30 TABLET | Refills: 11 | Status: SHIPPED | OUTPATIENT
Start: 2018-12-23 | End: 2022-08-16

## 2021-09-20 ENCOUNTER — OFFICE VISIT (OUTPATIENT)
Dept: OBSTETRICS AND GYNECOLOGY | Facility: CLINIC | Age: 31
End: 2021-09-20
Payer: COMMERCIAL

## 2021-09-20 VITALS
DIASTOLIC BLOOD PRESSURE: 70 MMHG | HEIGHT: 61 IN | WEIGHT: 128.5 LBS | BODY MASS INDEX: 24.26 KG/M2 | SYSTOLIC BLOOD PRESSURE: 112 MMHG

## 2021-09-20 DIAGNOSIS — D22.9 CHANGE IN MOLE: ICD-10-CM

## 2021-09-20 DIAGNOSIS — Z30.011 ENCOUNTER FOR INITIAL PRESCRIPTION OF CONTRACEPTIVE PILLS: ICD-10-CM

## 2021-09-20 DIAGNOSIS — Z01.419 WELL WOMAN EXAM WITH ROUTINE GYNECOLOGICAL EXAM: Primary | ICD-10-CM

## 2021-09-20 PROCEDURE — 99213 OFFICE O/P EST LOW 20 MIN: CPT | Mod: PBBFAC | Performed by: OBSTETRICS & GYNECOLOGY

## 2021-09-20 PROCEDURE — 87624 HPV HI-RISK TYP POOLED RSLT: CPT | Performed by: OBSTETRICS & GYNECOLOGY

## 2021-09-20 PROCEDURE — 99999 PR PBB SHADOW E&M-EST. PATIENT-LVL III: ICD-10-PCS | Mod: PBBFAC,,, | Performed by: OBSTETRICS & GYNECOLOGY

## 2021-09-20 PROCEDURE — 99395 PR PREVENTIVE VISIT,EST,18-39: ICD-10-PCS | Mod: S$GLB,,, | Performed by: OBSTETRICS & GYNECOLOGY

## 2021-09-20 PROCEDURE — 88175 CYTOPATH C/V AUTO FLUID REDO: CPT | Performed by: OBSTETRICS & GYNECOLOGY

## 2021-09-20 PROCEDURE — 99395 PREV VISIT EST AGE 18-39: CPT | Mod: S$GLB,,, | Performed by: OBSTETRICS & GYNECOLOGY

## 2021-09-20 PROCEDURE — 99999 PR PBB SHADOW E&M-EST. PATIENT-LVL III: CPT | Mod: PBBFAC,,, | Performed by: OBSTETRICS & GYNECOLOGY

## 2021-09-20 RX ORDER — ACETAMINOPHEN AND CODEINE PHOSPHATE 120; 12 MG/5ML; MG/5ML
1 SOLUTION ORAL DAILY
Qty: 28 TABLET | Refills: 11 | Status: SHIPPED | OUTPATIENT
Start: 2021-09-20 | End: 2022-08-16

## 2021-09-23 ENCOUNTER — PATIENT MESSAGE (OUTPATIENT)
Dept: OBSTETRICS AND GYNECOLOGY | Facility: CLINIC | Age: 31
End: 2021-09-23

## 2021-09-23 LAB
HPV HR 12 DNA SPEC QL NAA+PROBE: POSITIVE
HPV16 AG SPEC QL: NEGATIVE
HPV18 DNA SPEC QL NAA+PROBE: NEGATIVE

## 2021-09-24 LAB
FINAL PATHOLOGIC DIAGNOSIS: NORMAL
Lab: NORMAL

## 2021-09-27 ENCOUNTER — TELEPHONE (OUTPATIENT)
Dept: OBSTETRICS AND GYNECOLOGY | Facility: CLINIC | Age: 31
End: 2021-09-27

## 2021-10-13 ENCOUNTER — OFFICE VISIT (OUTPATIENT)
Dept: DERMATOLOGY | Facility: CLINIC | Age: 31
End: 2021-10-13
Payer: COMMERCIAL

## 2021-10-13 DIAGNOSIS — L81.4 SOLAR LENTIGO: Primary | ICD-10-CM

## 2021-10-13 DIAGNOSIS — Z12.83 SKIN CANCER SCREENING: ICD-10-CM

## 2021-10-13 DIAGNOSIS — D22.9 MULTIPLE BENIGN NEVI: ICD-10-CM

## 2021-10-13 PROCEDURE — 1160F RVW MEDS BY RX/DR IN RCRD: CPT | Mod: CPTII,S$GLB,, | Performed by: STUDENT IN AN ORGANIZED HEALTH CARE EDUCATION/TRAINING PROGRAM

## 2021-10-13 PROCEDURE — 1160F PR REVIEW ALL MEDS BY PRESCRIBER/CLIN PHARMACIST DOCUMENTED: ICD-10-PCS | Mod: CPTII,S$GLB,, | Performed by: STUDENT IN AN ORGANIZED HEALTH CARE EDUCATION/TRAINING PROGRAM

## 2021-10-13 PROCEDURE — 1159F MED LIST DOCD IN RCRD: CPT | Mod: CPTII,S$GLB,, | Performed by: STUDENT IN AN ORGANIZED HEALTH CARE EDUCATION/TRAINING PROGRAM

## 2021-10-13 PROCEDURE — 99999 PR PBB SHADOW E&M-EST. PATIENT-LVL III: CPT | Mod: PBBFAC,,, | Performed by: STUDENT IN AN ORGANIZED HEALTH CARE EDUCATION/TRAINING PROGRAM

## 2021-10-13 PROCEDURE — 99999 PR PBB SHADOW E&M-EST. PATIENT-LVL III: ICD-10-PCS | Mod: PBBFAC,,, | Performed by: STUDENT IN AN ORGANIZED HEALTH CARE EDUCATION/TRAINING PROGRAM

## 2021-10-13 PROCEDURE — 1159F PR MEDICATION LIST DOCUMENTED IN MEDICAL RECORD: ICD-10-PCS | Mod: CPTII,S$GLB,, | Performed by: STUDENT IN AN ORGANIZED HEALTH CARE EDUCATION/TRAINING PROGRAM

## 2021-10-13 PROCEDURE — 99203 OFFICE O/P NEW LOW 30 MIN: CPT | Mod: S$GLB,,, | Performed by: STUDENT IN AN ORGANIZED HEALTH CARE EDUCATION/TRAINING PROGRAM

## 2021-10-13 PROCEDURE — 99203 PR OFFICE/OUTPT VISIT, NEW, LEVL III, 30-44 MIN: ICD-10-PCS | Mod: S$GLB,,, | Performed by: STUDENT IN AN ORGANIZED HEALTH CARE EDUCATION/TRAINING PROGRAM

## 2021-11-11 NOTE — PROGRESS NOTES
See report in viewpoint

## 2022-08-16 ENCOUNTER — OFFICE VISIT (OUTPATIENT)
Dept: INTERNAL MEDICINE | Facility: CLINIC | Age: 32
End: 2022-08-16
Payer: COMMERCIAL

## 2022-08-16 VITALS
OXYGEN SATURATION: 100 % | HEART RATE: 68 BPM | HEIGHT: 61 IN | WEIGHT: 125.31 LBS | TEMPERATURE: 97 F | DIASTOLIC BLOOD PRESSURE: 70 MMHG | SYSTOLIC BLOOD PRESSURE: 100 MMHG | BODY MASS INDEX: 23.66 KG/M2

## 2022-08-16 DIAGNOSIS — Z11.59 NEED FOR HEPATITIS C SCREENING TEST: ICD-10-CM

## 2022-08-16 DIAGNOSIS — Z13.220 SCREENING FOR LIPOID DISORDERS: ICD-10-CM

## 2022-08-16 DIAGNOSIS — Z13.29 THYROID DISORDER SCREEN: ICD-10-CM

## 2022-08-16 DIAGNOSIS — Z00.00 ROUTINE GENERAL MEDICAL EXAMINATION AT A HEALTH CARE FACILITY: Primary | ICD-10-CM

## 2022-08-16 PROCEDURE — 1160F RVW MEDS BY RX/DR IN RCRD: CPT | Mod: CPTII,S$GLB,, | Performed by: FAMILY MEDICINE

## 2022-08-16 PROCEDURE — 99385 PR PREVENTIVE VISIT,NEW,18-39: ICD-10-PCS | Mod: S$GLB,,, | Performed by: FAMILY MEDICINE

## 2022-08-16 PROCEDURE — 3074F SYST BP LT 130 MM HG: CPT | Mod: CPTII,S$GLB,, | Performed by: FAMILY MEDICINE

## 2022-08-16 PROCEDURE — 99385 PREV VISIT NEW AGE 18-39: CPT | Mod: S$GLB,,, | Performed by: FAMILY MEDICINE

## 2022-08-16 PROCEDURE — 3078F PR MOST RECENT DIASTOLIC BLOOD PRESSURE < 80 MM HG: ICD-10-PCS | Mod: CPTII,S$GLB,, | Performed by: FAMILY MEDICINE

## 2022-08-16 PROCEDURE — 99999 PR PBB SHADOW E&M-EST. PATIENT-LVL IV: ICD-10-PCS | Mod: PBBFAC,,, | Performed by: FAMILY MEDICINE

## 2022-08-16 PROCEDURE — 3008F PR BODY MASS INDEX (BMI) DOCUMENTED: ICD-10-PCS | Mod: CPTII,S$GLB,, | Performed by: FAMILY MEDICINE

## 2022-08-16 PROCEDURE — 3074F PR MOST RECENT SYSTOLIC BLOOD PRESSURE < 130 MM HG: ICD-10-PCS | Mod: CPTII,S$GLB,, | Performed by: FAMILY MEDICINE

## 2022-08-16 PROCEDURE — 1160F PR REVIEW ALL MEDS BY PRESCRIBER/CLIN PHARMACIST DOCUMENTED: ICD-10-PCS | Mod: CPTII,S$GLB,, | Performed by: FAMILY MEDICINE

## 2022-08-16 PROCEDURE — 3078F DIAST BP <80 MM HG: CPT | Mod: CPTII,S$GLB,, | Performed by: FAMILY MEDICINE

## 2022-08-16 PROCEDURE — 1159F MED LIST DOCD IN RCRD: CPT | Mod: CPTII,S$GLB,, | Performed by: FAMILY MEDICINE

## 2022-08-16 PROCEDURE — 1159F PR MEDICATION LIST DOCUMENTED IN MEDICAL RECORD: ICD-10-PCS | Mod: CPTII,S$GLB,, | Performed by: FAMILY MEDICINE

## 2022-08-16 PROCEDURE — 3008F BODY MASS INDEX DOCD: CPT | Mod: CPTII,S$GLB,, | Performed by: FAMILY MEDICINE

## 2022-08-16 PROCEDURE — 99999 PR PBB SHADOW E&M-EST. PATIENT-LVL IV: CPT | Mod: PBBFAC,,, | Performed by: FAMILY MEDICINE

## 2022-08-16 NOTE — PATIENT INSTRUCTIONS
Try 3-5 minutes daily of guided meditation  [employer] - has free 10 day introduction  Simply Being  Calm  Simple Habit     When you feel yourself getting anxious/stressed take a breathing time out. Breathe in to count of 4, hold for count of 2, breathe out for count of 8; repeat 10 times and it should help you feel more calm.      Moderna and Pfizer vaccine booster shots are approved for adults at increased risk at least six months after their initial immunization and Reilly & Reilly (J&J) COVID-19 vaccine booster shots are approved for all adults at least two months after their initial immunization.    Booster doses for all three COVID-19 vaccines, Pfizer, Moderna and Reilly & Reilly, are now available to the public and are being administered at [facility] vaccination locations.     If you received an mRNA vaccine (Pfizer or Moderna) it is recommended that you receive the same booster dose as your original vaccine series. However, all patients eligible for a booster dose may decide to mix and match your booster dose.     Below are the current mix and match options [facility] currently offers:    Pfizer Vaccine Recipients:  Booster Dose 6 months following 2nd dose can be, in order of recommendation:  Pfizer Booster Dose,  Moderna Booster Dose, or  Reilly & Reilly Booster Dose    Reilly & Reilly Vaccine Recipients:   Booster Dose 2 months following initial dose can be, in order of recommendation:  Pfizer Booster Dose,  Moderna Booster Dose, or  Reilly & Reilly Booster Dose    Both Pfizer and Reilly & Reilly boosters have the same dosage as the original vaccine regimen.    Moderna Vaccine Recipients:   Booster Dose 6 months following 2nd dose can be, in order of recommendation:  Moderna Booster Dose,  Pfizer Booster Dose, or  Reilly & Reilly Booster Dose    The Moderna booster is half the dosage of the original two-dose Moderna series, and [facility] will be following this guidance as outlined  by the FDA and CDC.    International patients who have received a non-[place] approved COVID-19 vaccine are eligible for either a Pfizer booster dose or a Reilly & Reilly booster dose 28 days following their original vaccine dose.     Please schedule your appointment via [facility] or by calling [phone]. Walk-ins will also be accepted as supply allows.    To learn more about COVID-19 vaccination and community vaccine locations, please visit [email or web address].

## 2022-08-16 NOTE — PROGRESS NOTES
Subjective:       Patient ID: Sheree Melgar is a 32 y.o. female here today to establish care.    Chief Complaint: Establish Care    Patient presents to clinic today for establish care physical.    Review of Systems   Constitutional: Negative for chills, fatigue, fever and unexpected weight change.   HENT: Negative for congestion, dental problem, ear pain, hearing loss, rhinorrhea and trouble swallowing.    Eyes: Positive for visual disturbance (new, minor, suspected with new job & increase computer use). Negative for pain.   Respiratory: Negative for cough and shortness of breath.    Cardiovascular: Negative for chest pain, palpitations and leg swelling.   Gastrointestinal: Negative for abdominal distention, abdominal pain, blood in stool, constipation, diarrhea, nausea and vomiting.   Genitourinary: Negative for difficulty urinating and vaginal discharge.   Musculoskeletal: Negative for arthralgias and myalgias.   Skin: Negative for rash.   Neurological: Positive for headaches (migraines; suspected work related). Negative for dizziness, weakness and numbness.   Hematological: Negative for adenopathy. Does not bruise/bleed easily.   Psychiatric/Behavioral: Negative for dysphoric mood and sleep disturbance. The patient is nervous/anxious (work related; nurse - teaches, hospice on weekends).        Objective:      Physical Exam  Vitals reviewed.   Constitutional:       General: She is not in acute distress.     Appearance: Normal appearance. She is well-developed.   HENT:      Head: Normocephalic and atraumatic.      Right Ear: Tympanic membrane, ear canal and external ear normal.      Left Ear: Tympanic membrane, ear canal and external ear normal.      Nose: Nose normal. No mucosal edema or rhinorrhea.      Mouth/Throat:      Pharynx: Uvula midline.   Eyes:      General: Lids are normal. No scleral icterus.     Extraocular Movements: Extraocular movements intact.      Conjunctiva/sclera: Conjunctivae  normal.      Pupils: Pupils are equal, round, and reactive to light.   Neck:      Thyroid: No thyromegaly.   Cardiovascular:      Rate and Rhythm: Normal rate and regular rhythm.      Heart sounds: No murmur heard.    No friction rub. No gallop.   Pulmonary:      Effort: Pulmonary effort is normal.      Breath sounds: Normal breath sounds. No wheezing, rhonchi or rales.   Abdominal:      General: Bowel sounds are normal. There is no distension.      Palpations: Abdomen is soft. There is no mass.      Tenderness: There is no abdominal tenderness.   Musculoskeletal:         General: Normal range of motion.      Cervical back: Normal range of motion and neck supple.   Lymphadenopathy:      Cervical: No cervical adenopathy.   Skin:     General: Skin is warm and dry.      Findings: No lesion or rash.      Nails: There is no clubbing.   Neurological:      Mental Status: She is alert and oriented to person, place, and time.      Cranial Nerves: No cranial nerve deficit.      Sensory: No sensory deficit.      Gait: Gait normal.   Psychiatric:         Mood and Affect: Mood and affect normal.         Assessment:       1. Routine general medical examination at a health care facility    2. Screening for lipoid disorders    3. Thyroid disorder screen    4. Need for hepatitis C screening test        Plan:   1. Routine general medical examination at a health care facility  -     Comprehensive Metabolic Panel  -     CBC Auto Differential    2. Screening for lipoid disorders  -     Lipid Panel    3. Thyroid disorder screen  -     TSH    4. Need for hepatitis C screening test  -     Hepatitis C Antibody      Discussed anxiety/stress management. Encouraged guided meditation and breathing exercises. Schedule visit if she feels she needs to consider medication in the future.  Can try excedrin prn headache.  Patient expressed understanding and agreement with plan.  Health Maintenance reviewed/updated.  Discussed eligibility for covid  booster, given scheduling information.

## 2022-08-26 ENCOUNTER — LAB VISIT (OUTPATIENT)
Dept: LAB | Facility: HOSPITAL | Age: 32
End: 2022-08-26
Attending: FAMILY MEDICINE
Payer: COMMERCIAL

## 2022-08-26 DIAGNOSIS — Z13.220 SCREENING FOR LIPOID DISORDERS: ICD-10-CM

## 2022-08-26 DIAGNOSIS — Z13.29 THYROID DISORDER SCREEN: ICD-10-CM

## 2022-08-26 DIAGNOSIS — Z11.59 NEED FOR HEPATITIS C SCREENING TEST: ICD-10-CM

## 2022-08-26 DIAGNOSIS — Z00.00 ROUTINE GENERAL MEDICAL EXAMINATION AT A HEALTH CARE FACILITY: ICD-10-CM

## 2022-08-26 LAB
ALBUMIN SERPL BCP-MCNC: 4.2 G/DL (ref 3.5–5.2)
ALP SERPL-CCNC: 47 U/L (ref 55–135)
ALT SERPL W/O P-5'-P-CCNC: 10 U/L (ref 10–44)
ANION GAP SERPL CALC-SCNC: 10 MMOL/L (ref 8–16)
AST SERPL-CCNC: 14 U/L (ref 10–40)
BASOPHILS # BLD AUTO: 0.02 K/UL (ref 0–0.2)
BASOPHILS NFR BLD: 0.5 % (ref 0–1.9)
BILIRUB SERPL-MCNC: 0.6 MG/DL (ref 0.1–1)
BUN SERPL-MCNC: 12 MG/DL (ref 6–20)
CALCIUM SERPL-MCNC: 9.1 MG/DL (ref 8.7–10.5)
CHLORIDE SERPL-SCNC: 106 MMOL/L (ref 95–110)
CHOLEST SERPL-MCNC: 177 MG/DL (ref 120–199)
CHOLEST/HDLC SERPL: 2.8 {RATIO} (ref 2–5)
CO2 SERPL-SCNC: 22 MMOL/L (ref 23–29)
CREAT SERPL-MCNC: 0.7 MG/DL (ref 0.5–1.4)
DIFFERENTIAL METHOD: ABNORMAL
EOSINOPHIL # BLD AUTO: 0.1 K/UL (ref 0–0.5)
EOSINOPHIL NFR BLD: 1.2 % (ref 0–8)
ERYTHROCYTE [DISTWIDTH] IN BLOOD BY AUTOMATED COUNT: 12.2 % (ref 11.5–14.5)
EST. GFR  (NO RACE VARIABLE): >60 ML/MIN/1.73 M^2
GLUCOSE SERPL-MCNC: 95 MG/DL (ref 70–110)
HCT VFR BLD AUTO: 38.9 % (ref 37–48.5)
HDLC SERPL-MCNC: 64 MG/DL (ref 40–75)
HDLC SERPL: 36.2 % (ref 20–50)
HGB BLD-MCNC: 12.8 G/DL (ref 12–16)
IMM GRANULOCYTES # BLD AUTO: 0.01 K/UL (ref 0–0.04)
IMM GRANULOCYTES NFR BLD AUTO: 0.2 % (ref 0–0.5)
LDLC SERPL CALC-MCNC: 101.8 MG/DL (ref 63–159)
LYMPHOCYTES # BLD AUTO: 1.2 K/UL (ref 1–4.8)
LYMPHOCYTES NFR BLD: 27.4 % (ref 18–48)
MCH RBC QN AUTO: 30.2 PG (ref 27–31)
MCHC RBC AUTO-ENTMCNC: 32.9 G/DL (ref 32–36)
MCV RBC AUTO: 92 FL (ref 82–98)
MONOCYTES # BLD AUTO: 0.2 K/UL (ref 0.3–1)
MONOCYTES NFR BLD: 5.6 % (ref 4–15)
NEUTROPHILS # BLD AUTO: 2.8 K/UL (ref 1.8–7.7)
NEUTROPHILS NFR BLD: 65.1 % (ref 38–73)
NONHDLC SERPL-MCNC: 113 MG/DL
NRBC BLD-RTO: 0 /100 WBC
PLATELET # BLD AUTO: 245 K/UL (ref 150–450)
PMV BLD AUTO: 11.1 FL (ref 9.2–12.9)
POTASSIUM SERPL-SCNC: 3.8 MMOL/L (ref 3.5–5.1)
PROT SERPL-MCNC: 7.6 G/DL (ref 6–8.4)
RBC # BLD AUTO: 4.24 M/UL (ref 4–5.4)
SODIUM SERPL-SCNC: 138 MMOL/L (ref 136–145)
TRIGL SERPL-MCNC: 56 MG/DL (ref 30–150)
TSH SERPL DL<=0.005 MIU/L-ACNC: 0.89 UIU/ML (ref 0.4–4)
WBC # BLD AUTO: 4.31 K/UL (ref 3.9–12.7)

## 2022-08-26 PROCEDURE — 80053 COMPREHEN METABOLIC PANEL: CPT | Performed by: FAMILY MEDICINE

## 2022-08-26 PROCEDURE — 80061 LIPID PANEL: CPT | Performed by: FAMILY MEDICINE

## 2022-08-26 PROCEDURE — 84443 ASSAY THYROID STIM HORMONE: CPT | Performed by: FAMILY MEDICINE

## 2022-08-26 PROCEDURE — 85025 COMPLETE CBC W/AUTO DIFF WBC: CPT | Performed by: FAMILY MEDICINE

## 2022-08-26 PROCEDURE — 36415 COLL VENOUS BLD VENIPUNCTURE: CPT | Performed by: FAMILY MEDICINE

## 2022-08-26 PROCEDURE — 86803 HEPATITIS C AB TEST: CPT | Performed by: FAMILY MEDICINE

## 2022-08-29 LAB — HCV AB SERPL QL IA: NEGATIVE

## 2023-08-21 ENCOUNTER — PATIENT MESSAGE (OUTPATIENT)
Dept: INTERNAL MEDICINE | Facility: CLINIC | Age: 33
End: 2023-08-21
Payer: COMMERCIAL

## 2024-03-07 ENCOUNTER — OFFICE VISIT (OUTPATIENT)
Dept: OBSTETRICS AND GYNECOLOGY | Facility: CLINIC | Age: 34
End: 2024-03-07
Payer: COMMERCIAL

## 2024-03-07 VITALS — BODY MASS INDEX: 27.06 KG/M2 | HEIGHT: 61 IN | WEIGHT: 143.31 LBS

## 2024-03-07 DIAGNOSIS — Z12.4 PAPANICOLAOU SMEAR FOR CERVICAL CANCER SCREENING: ICD-10-CM

## 2024-03-07 DIAGNOSIS — N92.6 IRREGULAR PERIODS/MENSTRUAL CYCLES: ICD-10-CM

## 2024-03-07 DIAGNOSIS — Z01.419 ROUTINE GYNECOLOGICAL EXAMINATION: Primary | ICD-10-CM

## 2024-03-07 PROCEDURE — 3008F BODY MASS INDEX DOCD: CPT | Mod: CPTII,S$GLB,, | Performed by: NURSE PRACTITIONER

## 2024-03-07 PROCEDURE — 1160F RVW MEDS BY RX/DR IN RCRD: CPT | Mod: CPTII,S$GLB,, | Performed by: NURSE PRACTITIONER

## 2024-03-07 PROCEDURE — 87624 HPV HI-RISK TYP POOLED RSLT: CPT | Performed by: NURSE PRACTITIONER

## 2024-03-07 PROCEDURE — 99395 PREV VISIT EST AGE 18-39: CPT | Mod: S$GLB,,, | Performed by: NURSE PRACTITIONER

## 2024-03-07 PROCEDURE — 1159F MED LIST DOCD IN RCRD: CPT | Mod: CPTII,S$GLB,, | Performed by: NURSE PRACTITIONER

## 2024-03-07 PROCEDURE — 99999 PR PBB SHADOW E&M-EST. PATIENT-LVL III: CPT | Mod: PBBFAC,,, | Performed by: NURSE PRACTITIONER

## 2024-03-07 PROCEDURE — 88175 CYTOPATH C/V AUTO FLUID REDO: CPT | Performed by: NURSE PRACTITIONER

## 2024-03-07 NOTE — PROGRESS NOTES
"  Subjective:       Patient ID: Sheree Melgar is a 34 y.o. female.    Chief Complaint:  Annual Exam      History of Present Illness  HPI  She is not on hormonal birth control and her cycle occur every at least every 3-6 months   Health Maintenance   Topic Date Due    TETANUS VACCINE  2028    Hepatitis C Screening  Completed    Lipid Panel  Completed     GYN & OB History  Patient's last menstrual period was 2024.   Date of Last Pap: today     OB History    Para Term  AB Living   3 2 2 0 1 2   SAB IAB Ectopic Multiple Live Births   1 0 0 0 2      # Outcome Date GA Lbr Jim/2nd Weight Sex Delivery Anes PTL Lv   3 Term 18 41w4d  4.39 kg (9 lb 10.9 oz) F CS-LTranv EPI N TUTU      Complications: Failure to Progress in Second Stage   2 SAB            1 Term      CS-LTranv   TUTU       Review of Systems  Review of Systems        Objective:   Ht 5' 1" (1.549 m)   Wt 65 kg (143 lb 4.8 oz)   LMP 2024   BMI 27.08 kg/m²    Physical Exam:   Constitutional: She is oriented to person, place, and time. She appears well-developed and well-nourished.        Pulmonary/Chest: Right breast exhibits no inverted nipple, no mass, no nipple discharge, no skin change, no tenderness and no swelling. Left breast exhibits no inverted nipple, no mass, no nipple discharge, no skin change, no tenderness and no swelling.        Abdominal: Soft.     Genitourinary:    Inguinal canal and vagina normal.      Pelvic exam was performed with patient supine.   The external female genitalia was normal.   Genitalia hair distrobution normal .     Labial bartholins normal.Cervix is normal. No erythema, vaginal discharge, bleeding, rectocele, cystocele or prolapse of vaginal walls in the vagina.    No foreign body in the vagina.      No signs of injury in the vagina.      pap smear completed              Neurological: She is alert and oriented to person, place, and time.    Skin: Skin is warm and dry.  "   Psychiatric: She has a normal mood and affect. Her behavior is normal. Judgment and thought content normal.        Assessment:        1. Routine gynecological examination    2. Papanicolaou smear for cervical cancer screening    3. Irregular periods/menstrual cycles                Plan:            Sheree was seen today for annual exam.    Diagnoses and all orders for this visit:    Routine gynecological examination    Papanicolaou smear for cervical cancer screening  -     Liquid-Based Pap Smear, Screening  -     HPV High Risk Genotypes, PCR    Irregular periods/menstrual cycles    Return to clinic in one year for WWE   Verbalizes understanding that if she is NOT on hormonal birth control she must have a cycle at least every 3 to 6 months in order to decrease risk of uterine cancer, uterine  fibroids, or uterine tumors. If she not on hormonal birth control and she does not have a cycle at least every 3-6 months she must be prescribed provera for 10 days to induce a cycle. Verbalizes understanding that prior to taking this medication she MUST do a UPT prior to taking provera to ensure she is not pregnancy prior to ingesting provera. If she does not have a cycle 14 days after taking her last provera pill, she must rtc for further evaluation of amenorrhea.    Verbalizes understanding that if she is ON a hormonal birth control. It does not matter whether she has a cycle or not.

## 2024-03-11 ENCOUNTER — OFFICE VISIT (OUTPATIENT)
Dept: INTERNAL MEDICINE | Facility: CLINIC | Age: 34
End: 2024-03-11
Payer: COMMERCIAL

## 2024-03-11 ENCOUNTER — LAB VISIT (OUTPATIENT)
Dept: LAB | Facility: HOSPITAL | Age: 34
End: 2024-03-11
Attending: FAMILY MEDICINE
Payer: COMMERCIAL

## 2024-03-11 VITALS
TEMPERATURE: 98 F | RESPIRATION RATE: 18 BRPM | HEART RATE: 84 BPM | DIASTOLIC BLOOD PRESSURE: 72 MMHG | HEIGHT: 61 IN | BODY MASS INDEX: 27.01 KG/M2 | WEIGHT: 143.06 LBS | OXYGEN SATURATION: 96 % | SYSTOLIC BLOOD PRESSURE: 116 MMHG

## 2024-03-11 DIAGNOSIS — Z13.220 SCREENING FOR LIPOID DISORDERS: ICD-10-CM

## 2024-03-11 DIAGNOSIS — Z13.1 DIABETES MELLITUS SCREENING: ICD-10-CM

## 2024-03-11 DIAGNOSIS — H53.9 VISION CHANGES: ICD-10-CM

## 2024-03-11 DIAGNOSIS — Z00.00 ROUTINE GENERAL MEDICAL EXAMINATION AT A HEALTH CARE FACILITY: Primary | ICD-10-CM

## 2024-03-11 DIAGNOSIS — Z13.29 THYROID DISORDER SCREEN: ICD-10-CM

## 2024-03-11 DIAGNOSIS — Z00.00 ROUTINE GENERAL MEDICAL EXAMINATION AT A HEALTH CARE FACILITY: ICD-10-CM

## 2024-03-11 DIAGNOSIS — F41.9 ANXIETY: ICD-10-CM

## 2024-03-11 LAB
ALBUMIN SERPL BCP-MCNC: 4.3 G/DL (ref 3.5–5.2)
ALP SERPL-CCNC: 59 U/L (ref 55–135)
ALT SERPL W/O P-5'-P-CCNC: 20 U/L (ref 10–44)
ANION GAP SERPL CALC-SCNC: 9 MMOL/L (ref 8–16)
AST SERPL-CCNC: 21 U/L (ref 10–40)
BASOPHILS # BLD AUTO: 0.02 K/UL (ref 0–0.2)
BASOPHILS NFR BLD: 0.3 % (ref 0–1.9)
BILIRUB SERPL-MCNC: 0.6 MG/DL (ref 0.1–1)
BUN SERPL-MCNC: 8 MG/DL (ref 6–20)
CALCIUM SERPL-MCNC: 9.5 MG/DL (ref 8.7–10.5)
CHLORIDE SERPL-SCNC: 103 MMOL/L (ref 95–110)
CHOLEST SERPL-MCNC: 172 MG/DL (ref 120–199)
CHOLEST/HDLC SERPL: 3.1 {RATIO} (ref 2–5)
CO2 SERPL-SCNC: 25 MMOL/L (ref 23–29)
CREAT SERPL-MCNC: 0.6 MG/DL (ref 0.5–1.4)
DIFFERENTIAL METHOD BLD: ABNORMAL
EOSINOPHIL # BLD AUTO: 0 K/UL (ref 0–0.5)
EOSINOPHIL NFR BLD: 0.5 % (ref 0–8)
ERYTHROCYTE [DISTWIDTH] IN BLOOD BY AUTOMATED COUNT: 12 % (ref 11.5–14.5)
EST. GFR  (NO RACE VARIABLE): >60 ML/MIN/1.73 M^2
ESTIMATED AVG GLUCOSE: 105 MG/DL (ref 68–131)
GLUCOSE SERPL-MCNC: 76 MG/DL (ref 70–110)
HBA1C MFR BLD: 5.3 % (ref 4–5.6)
HCT VFR BLD AUTO: 40.7 % (ref 37–48.5)
HDLC SERPL-MCNC: 55 MG/DL (ref 40–75)
HDLC SERPL: 32 % (ref 20–50)
HGB BLD-MCNC: 13 G/DL (ref 12–16)
IMM GRANULOCYTES # BLD AUTO: 0.01 K/UL (ref 0–0.04)
IMM GRANULOCYTES NFR BLD AUTO: 0.2 % (ref 0–0.5)
LDLC SERPL CALC-MCNC: 107.4 MG/DL (ref 63–159)
LYMPHOCYTES # BLD AUTO: 1.2 K/UL (ref 1–4.8)
LYMPHOCYTES NFR BLD: 20.1 % (ref 18–48)
MCH RBC QN AUTO: 30.5 PG (ref 27–31)
MCHC RBC AUTO-ENTMCNC: 31.9 G/DL (ref 32–36)
MCV RBC AUTO: 96 FL (ref 82–98)
MONOCYTES # BLD AUTO: 0.3 K/UL (ref 0.3–1)
MONOCYTES NFR BLD: 5.1 % (ref 4–15)
NEUTROPHILS # BLD AUTO: 4.3 K/UL (ref 1.8–7.7)
NEUTROPHILS NFR BLD: 73.8 % (ref 38–73)
NONHDLC SERPL-MCNC: 117 MG/DL
NRBC BLD-RTO: 0 /100 WBC
PLATELET # BLD AUTO: 281 K/UL (ref 150–450)
PMV BLD AUTO: 11.4 FL (ref 9.2–12.9)
POTASSIUM SERPL-SCNC: 4.1 MMOL/L (ref 3.5–5.1)
PROT SERPL-MCNC: 7.7 G/DL (ref 6–8.4)
RBC # BLD AUTO: 4.26 M/UL (ref 4–5.4)
SODIUM SERPL-SCNC: 137 MMOL/L (ref 136–145)
TRIGL SERPL-MCNC: 48 MG/DL (ref 30–150)
TSH SERPL DL<=0.005 MIU/L-ACNC: 0.74 UIU/ML (ref 0.4–4)
WBC # BLD AUTO: 5.88 K/UL (ref 3.9–12.7)

## 2024-03-11 PROCEDURE — 1159F MED LIST DOCD IN RCRD: CPT | Mod: CPTII,S$GLB,, | Performed by: FAMILY MEDICINE

## 2024-03-11 PROCEDURE — 99999 PR PBB SHADOW E&M-EST. PATIENT-LVL IV: CPT | Mod: PBBFAC,,, | Performed by: FAMILY MEDICINE

## 2024-03-11 PROCEDURE — 3074F SYST BP LT 130 MM HG: CPT | Mod: CPTII,S$GLB,, | Performed by: FAMILY MEDICINE

## 2024-03-11 PROCEDURE — 85025 COMPLETE CBC W/AUTO DIFF WBC: CPT | Performed by: FAMILY MEDICINE

## 2024-03-11 PROCEDURE — 3008F BODY MASS INDEX DOCD: CPT | Mod: CPTII,S$GLB,, | Performed by: FAMILY MEDICINE

## 2024-03-11 PROCEDURE — 83036 HEMOGLOBIN GLYCOSYLATED A1C: CPT | Performed by: FAMILY MEDICINE

## 2024-03-11 PROCEDURE — 3078F DIAST BP <80 MM HG: CPT | Mod: CPTII,S$GLB,, | Performed by: FAMILY MEDICINE

## 2024-03-11 PROCEDURE — 80053 COMPREHEN METABOLIC PANEL: CPT | Performed by: FAMILY MEDICINE

## 2024-03-11 PROCEDURE — 80061 LIPID PANEL: CPT | Performed by: FAMILY MEDICINE

## 2024-03-11 PROCEDURE — 99395 PREV VISIT EST AGE 18-39: CPT | Mod: S$GLB,,, | Performed by: FAMILY MEDICINE

## 2024-03-11 PROCEDURE — 36415 COLL VENOUS BLD VENIPUNCTURE: CPT | Performed by: FAMILY MEDICINE

## 2024-03-11 PROCEDURE — 1160F RVW MEDS BY RX/DR IN RCRD: CPT | Mod: CPTII,S$GLB,, | Performed by: FAMILY MEDICINE

## 2024-03-11 PROCEDURE — 84443 ASSAY THYROID STIM HORMONE: CPT | Performed by: FAMILY MEDICINE
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
## 2024-03-15 LAB
FINAL PATHOLOGIC DIAGNOSIS: NORMAL
HPV HR 12 DNA SPEC QL NAA+PROBE: NEGATIVE
HPV16 AG SPEC QL: NEGATIVE
HPV18 DNA SPEC QL NAA+PROBE: NEGATIVE
Lab: NORMAL

## 2025-03-12 ENCOUNTER — OFFICE VISIT (OUTPATIENT)
Dept: INTERNAL MEDICINE | Facility: CLINIC | Age: 35
End: 2025-03-12
Payer: COMMERCIAL

## 2025-03-12 VITALS
BODY MASS INDEX: 27.28 KG/M2 | OXYGEN SATURATION: 97 % | WEIGHT: 144.5 LBS | SYSTOLIC BLOOD PRESSURE: 111 MMHG | HEIGHT: 61 IN | HEART RATE: 100 BPM | DIASTOLIC BLOOD PRESSURE: 74 MMHG

## 2025-03-12 DIAGNOSIS — Z00.00 ROUTINE GENERAL MEDICAL EXAMINATION AT A HEALTH CARE FACILITY: Primary | ICD-10-CM

## 2025-03-12 PROBLEM — Z98.891 S/P CESAREAN SECTION: Status: RESOLVED | Noted: 2018-09-17 | Resolved: 2025-03-12

## 2025-03-12 PROCEDURE — 99395 PREV VISIT EST AGE 18-39: CPT | Mod: S$GLB,,, | Performed by: PHYSICIAN ASSISTANT

## 2025-03-12 PROCEDURE — 3008F BODY MASS INDEX DOCD: CPT | Mod: CPTII,S$GLB,, | Performed by: PHYSICIAN ASSISTANT

## 2025-03-12 PROCEDURE — 99999 PR PBB SHADOW E&M-EST. PATIENT-LVL III: CPT | Mod: PBBFAC,,, | Performed by: PHYSICIAN ASSISTANT

## 2025-03-12 PROCEDURE — 3078F DIAST BP <80 MM HG: CPT | Mod: CPTII,S$GLB,, | Performed by: PHYSICIAN ASSISTANT

## 2025-03-12 PROCEDURE — 1160F RVW MEDS BY RX/DR IN RCRD: CPT | Mod: CPTII,S$GLB,, | Performed by: PHYSICIAN ASSISTANT

## 2025-03-12 PROCEDURE — 1159F MED LIST DOCD IN RCRD: CPT | Mod: CPTII,S$GLB,, | Performed by: PHYSICIAN ASSISTANT

## 2025-03-12 PROCEDURE — 3074F SYST BP LT 130 MM HG: CPT | Mod: CPTII,S$GLB,, | Performed by: PHYSICIAN ASSISTANT

## 2025-03-12 NOTE — PROGRESS NOTES
Subjective:       Patient ID: Sheree Melgar is a 35 y.o. female.    CHIEF COMPLAINT:  - Sheree presents for an annual wellness visit.    HPI:  - Sheree reports anxiety but does not feel medication is necessary. She is aware of strategies to manage her anxiety when it becomes overwhelming. Her previous employment caused excessive rumination, but her current work situation is satisfactory. 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 She describes having a strong inclination towards work, but working from home has provided her with a good balance.    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   HENT:  Negative for congestion, dental problem, ear pain, hearing loss, rhinorrhea and trouble swallowing.    Eyes:  Negative for pain and visual disturbance.   Respiratory:  Negative for cough and shortness of breath.    Cardiovascular:  Negative for chest pain, palpitations and leg swelling.   Gastrointestinal:  Negative for abdominal distention, abdominal pain, blood in stool, constipation, diarrhea, nausea and vomiting.   Genitourinary:  Negative for difficulty urinating and pelvic pain.   Musculoskeletal:  Negative for arthralgias and myalgias.   Skin:  Negative for rash.   Neurological:  Negative for dizziness, weakness, numbness and headaches.   Hematological:  Negative for adenopathy. Does not bruise/bleed easily.   Psychiatric/Behavioral:  Negative for dysphoric mood and sleep disturbance. The patient is not nervous/anxious.        Objective:   Laboratory Reviewed ({Yes), Patient to have new labs done at completion of  "today's visit or within 1-2 weeks (if not fasting). Will follow up with Patient about updated labs via [employer].       Lab Results   Component Value Date     03/11/2024    K 4.1 03/11/2024     03/11/2024    CO2 25 03/11/2024    GLU 76 03/11/2024    BUN 8 03/11/2024    CREATININE 0.6 03/11/2024    CALCIUM 9.5 03/11/2024    PROT 7.7 03/11/2024    ALBUMIN 4.3 03/11/2024    BILITOT 0.6 03/11/2024    ALKPHOS 59 03/11/2024    AST 21 03/11/2024    ALT 20 03/11/2024    EGFRNORACEVR >60.0 03/11/2024    ANIONGAP 9 03/11/2024       Lab Results   Component Value Date    CHOL 172 03/11/2024    TRIG 48 03/11/2024    HDL 55 03/11/2024    LDLCALC 107.4 03/11/2024       Lab Results   Component Value Date    WBC 5.88 03/11/2024    RBC 4.26 03/11/2024    HGB 13.0 03/11/2024    HCT 40.7 03/11/2024    MCV 96 03/11/2024     03/11/2024    GRAN 4.3 03/11/2024    GRAN 73.8 (H) 03/11/2024    LYMPH 1.2 03/11/2024    LYMPH 20.1 03/11/2024    MONO 0.3 03/11/2024    MONO 5.1 03/11/2024    EOSINOPHIL 0.5 03/11/2024    BASOPHIL 0.3 03/11/2024       Lab Results   Component Value Date    TSH 0.744 03/11/2024       Lab Results   Component Value Date    HGBA1C 5.3 03/11/2024       No results found for: "LABMICR", "CREATRANDUR", "MICALBCREAT"    No results found for: "[id number]"      Physical Exam  Vitals reviewed.   Constitutional:       General: She is not in acute distress.     Appearance: Normal appearance. She is well-developed and normal weight. She is not ill-appearing or toxic-appearing.   HENT:      Head: Normocephalic and atraumatic.      Right Ear: Tympanic membrane, ear canal and external ear normal.      Left Ear: Tympanic membrane, ear canal and external ear normal.      Nose: Nose normal.      Mouth/Throat:      Mouth: Mucous membranes are dry.      Pharynx: Oropharynx is clear.   Eyes:      General: Lids are normal. No scleral icterus.     Extraocular Movements: Extraocular movements intact.      Conjunctiva/sclera: " Conjunctivae normal.      Pupils: Pupils are equal, round, and reactive to light.   Cardiovascular:      Rate and Rhythm: Normal rate and regular rhythm.      Pulses: Normal pulses.      Heart sounds: Normal heart sounds. No murmur heard.     No friction rub. No gallop.   Pulmonary:      Effort: Pulmonary effort is normal. No respiratory distress.      Breath sounds: Normal breath sounds. No stridor. No decreased breath sounds, wheezing, rhonchi or rales.   Abdominal:      General: Abdomen is flat. Bowel sounds are normal. There is no distension.      Palpations: Abdomen is soft. There is no mass.      Tenderness: There is no abdominal tenderness. There is no guarding or rebound.      Hernia: No hernia is present.   Musculoskeletal:         General: Normal range of motion.      Cervical back: Normal range of motion and neck supple. No tenderness.      Right lower leg: No edema.      Left lower leg: No edema.   Lymphadenopathy:      Cervical: No cervical adenopathy.   Skin:     General: Skin is warm and dry.   Neurological:      General: No focal deficit present.      Mental Status: She is alert and oriented to person, place, and time. Mental status is at baseline.      Cranial Nerves: No cranial nerve deficit.      Gait: Gait normal.   Psychiatric:         Mood and Affect: Mood and affect normal.         Behavior: Behavior normal.         Thought Content: Thought content normal.         Judgment: Judgment normal.         Assessment:       1. Routine general medical examination at a health care facility        Plan:   1. Routine general medical examination at a health care facility  -     Comprehensive Metabolic Panel; Future; Expected date: 03/12/2025  -     Lipid Panel; Future; Expected date: 03/12/2025  -     TSH; Future; Expected date: 03/12/2025  -     CBC Auto Differential; Future; Expected date: 03/12/2025  -     CBC Auto Differential; Future; Expected date: 03/12/2026  -     Comprehensive Metabolic Panel;  Future; Expected date: 03/12/2026  -     Lipid Panel; Future; Expected date: 03/12/2026  -     TSH; Future; Expected date: 03/12/2026          Assessment & Plan    IMPRESSION:  - Completed annual wellness visit for healthy patient  - Noted history of anxiety, currently managing well without medication  - Discussed potential short-term anxiety medication options (e.g., BuSpar) if needed in the future  - Completed physical exam, found no concerning issues    ANXIETY DISORDER:   Evaluated the patient's current anxiety status, noting good management without medication.   Educated the patient about BuSpar as a potential short-term anxiolytic option if needed in the future.   Instructed the patient to contact the office if anxiety worsens and medication is desired.    WORK-RELATED STRESS:   Assessed the patient's work situation, noting multiple jobs including hospice nursing, labs, and part-time HR responsibilities.   Acknowledged the patient's recognition of work addiction and the improved balance achieved through work-from-home opportunities.   Noted the patient's proactive adjustment to reduce hospice work to every other weekend.   Implicitly recognized the stressful nature of the patient's work, particularly in hospice care.   Encouraged continued self-monitoring of work-life balance and stress levels.    FOLLOW-UP AND GENERAL INSTRUCTIONS:   Offered the option of scheduling a virtual appointment to discuss lab results if necessary.   Ordered labs for current year and next year to be completed ahead of time.   Follow up in 1 year for annual appointment.   Schedule lab appointment for tomorrow at 12:00 (patient can arrive earlier or later).           Health Maintenance reviewed/updated.      Check-out note:  Please follow up in 1 year for Annual exam In-person with Dr. Perez.  Labs: Fasting lab [facility] at 9 and Fasting lab [facility]  Additional orders: Print AVS      This note was generated with the assistance of ambient  listening technology. Verbal consent was obtained by the patient and accompanying visitor(s) for the recording of patient appointment to facilitate this note. I attest to having reviewed and edited the generated note for accuracy, though some syntax or spelling errors may persist. Please contact the author of this note for any clarification.

## 2025-03-12 NOTE — PATIENT INSTRUCTIONS
Get your flu vaccine this Fall.     The bivalent covid booster is now available. You can visit [facility] in our primary care building by the front entrance to get this vaccine.     Pharmacy hours:  Monday-Friday 8am-5:30pm

## 2025-03-13 ENCOUNTER — LAB VISIT (OUTPATIENT)
Dept: LAB | Facility: HOSPITAL | Age: 35
End: 2025-03-13
Attending: PHYSICIAN ASSISTANT
Payer: COMMERCIAL

## 2025-03-13 DIAGNOSIS — Z00.00 ROUTINE GENERAL MEDICAL EXAMINATION AT A HEALTH CARE FACILITY: ICD-10-CM

## 2025-03-13 LAB
ALBUMIN SERPL BCP-MCNC: 4 G/DL (ref 3.5–5.2)
ALP SERPL-CCNC: 61 U/L (ref 40–150)
ALT SERPL W/O P-5'-P-CCNC: 43 U/L (ref 10–44)
ANION GAP SERPL CALC-SCNC: 10 MMOL/L (ref 8–16)
AST SERPL-CCNC: 28 U/L (ref 10–40)
BASOPHILS # BLD AUTO: 0.02 K/UL (ref 0–0.2)
BASOPHILS NFR BLD: 0.4 % (ref 0–1.9)
BILIRUB SERPL-MCNC: 0.5 MG/DL (ref 0.1–1)
BUN SERPL-MCNC: 11 MG/DL (ref 6–20)
CALCIUM SERPL-MCNC: 9.1 MG/DL (ref 8.7–10.5)
CHLORIDE SERPL-SCNC: 107 MMOL/L (ref 95–110)
CHOLEST SERPL-MCNC: 184 MG/DL (ref 120–199)
CHOLEST/HDLC SERPL: 3.7 {RATIO} (ref 2–5)
CO2 SERPL-SCNC: 24 MMOL/L (ref 23–29)
CREAT SERPL-MCNC: 0.7 MG/DL (ref 0.5–1.4)
DIFFERENTIAL METHOD BLD: NORMAL
EOSINOPHIL # BLD AUTO: 0.1 K/UL (ref 0–0.5)
EOSINOPHIL NFR BLD: 1.7 % (ref 0–8)
ERYTHROCYTE [DISTWIDTH] IN BLOOD BY AUTOMATED COUNT: 12.1 % (ref 11.5–14.5)
EST. GFR  (NO RACE VARIABLE): >60 ML/MIN/1.73 M^2
GLUCOSE SERPL-MCNC: 89 MG/DL (ref 70–110)
HCT VFR BLD AUTO: 40.2 % (ref 37–48.5)
HDLC SERPL-MCNC: 50 MG/DL (ref 40–75)
HDLC SERPL: 27.2 % (ref 20–50)
HGB BLD-MCNC: 13.1 G/DL (ref 12–16)
IMM GRANULOCYTES # BLD AUTO: 0.01 K/UL (ref 0–0.04)
IMM GRANULOCYTES NFR BLD AUTO: 0.2 % (ref 0–0.5)
LDLC SERPL CALC-MCNC: 114.2 MG/DL (ref 63–159)
LYMPHOCYTES # BLD AUTO: 1.4 K/UL (ref 1–4.8)
LYMPHOCYTES NFR BLD: 28.7 % (ref 18–48)
MCH RBC QN AUTO: 30.6 PG (ref 27–31)
MCHC RBC AUTO-ENTMCNC: 32.6 G/DL (ref 32–36)
MCV RBC AUTO: 94 FL (ref 82–98)
MONOCYTES # BLD AUTO: 0.3 K/UL (ref 0.3–1)
MONOCYTES NFR BLD: 6.4 % (ref 4–15)
NEUTROPHILS # BLD AUTO: 3 K/UL (ref 1.8–7.7)
NEUTROPHILS NFR BLD: 62.6 % (ref 38–73)
NONHDLC SERPL-MCNC: 134 MG/DL
NRBC BLD-RTO: 0 /100 WBC
PLATELET # BLD AUTO: 288 K/UL (ref 150–450)
PMV BLD AUTO: 11.3 FL (ref 9.2–12.9)
POTASSIUM SERPL-SCNC: 3.9 MMOL/L (ref 3.5–5.1)
PROT SERPL-MCNC: 7.6 G/DL (ref 6–8.4)
RBC # BLD AUTO: 4.28 M/UL (ref 4–5.4)
SODIUM SERPL-SCNC: 141 MMOL/L (ref 136–145)
TRIGL SERPL-MCNC: 99 MG/DL (ref 30–150)
TSH SERPL DL<=0.005 MIU/L-ACNC: 1.28 UIU/ML (ref 0.4–4)
WBC # BLD AUTO: 4.84 K/UL (ref 3.9–12.7)

## 2025-03-13 PROCEDURE — 80053 COMPREHEN METABOLIC PANEL: CPT | Performed by: PHYSICIAN ASSISTANT

## 2025-03-13 PROCEDURE — 36415 COLL VENOUS BLD VENIPUNCTURE: CPT | Performed by: PHYSICIAN ASSISTANT

## 2025-03-13 PROCEDURE — 84443 ASSAY THYROID STIM HORMONE: CPT | Performed by: PHYSICIAN ASSISTANT

## 2025-03-13 PROCEDURE — 80061 LIPID PANEL: CPT | Performed by: PHYSICIAN ASSISTANT

## 2025-03-13 PROCEDURE — 85025 COMPLETE CBC W/AUTO DIFF WBC: CPT | Performed by: PHYSICIAN ASSISTANT

## 2025-03-14 ENCOUNTER — RESULTS FOLLOW-UP (OUTPATIENT)
Dept: INTERNAL MEDICINE | Facility: CLINIC | Age: 35
End: 2025-03-14